# Patient Record
Sex: MALE | Race: WHITE | NOT HISPANIC OR LATINO | Employment: OTHER | URBAN - METROPOLITAN AREA
[De-identification: names, ages, dates, MRNs, and addresses within clinical notes are randomized per-mention and may not be internally consistent; named-entity substitution may affect disease eponyms.]

---

## 2021-05-28 ENCOUNTER — RECORDS - HEALTHEAST (OUTPATIENT)
Dept: ADMINISTRATIVE | Facility: CLINIC | Age: 66
End: 2021-05-28

## 2021-06-15 PROBLEM — E87.1 HYPONATREMIA: Status: ACTIVE | Noted: 2017-01-25

## 2021-06-15 PROBLEM — E22.2 SIADH (SYNDROME OF INAPPROPRIATE ADH PRODUCTION) (H): Status: ACTIVE | Noted: 2017-01-27

## 2021-06-15 PROBLEM — D50.9 MICROCYTIC ANEMIA: Status: ACTIVE | Noted: 2017-01-27

## 2021-06-16 PROBLEM — M31.6: Status: ACTIVE | Noted: 2018-09-04

## 2021-06-16 PROBLEM — M85.80 OSTEOPENIA: Status: ACTIVE | Noted: 2018-06-21

## 2023-09-22 ENCOUNTER — HOSPITAL ENCOUNTER (INPATIENT)
Facility: HOSPITAL | Age: 68
LOS: 3 days | Discharge: HOME | DRG: 178 | End: 2023-09-26
Attending: EMERGENCY MEDICINE | Admitting: HOSPITALIST
Payer: COMMERCIAL

## 2023-09-22 ENCOUNTER — APPOINTMENT (OUTPATIENT)
Dept: RADIOLOGY | Facility: HOSPITAL | Age: 68
DRG: 178 | End: 2023-09-22
Attending: EMERGENCY MEDICINE
Payer: COMMERCIAL

## 2023-09-22 DIAGNOSIS — K59.00 CONSTIPATION, UNSPECIFIED CONSTIPATION TYPE: ICD-10-CM

## 2023-09-22 DIAGNOSIS — I10 PRIMARY HYPERTENSION: Primary | ICD-10-CM

## 2023-09-22 DIAGNOSIS — U07.1 COVID-19: ICD-10-CM

## 2023-09-22 DIAGNOSIS — E87.1 HYPONATREMIA: ICD-10-CM

## 2023-09-22 LAB
ALBUMIN SERPL BCG-MCNC: 4.3 G/DL (ref 3.5–5.2)
ALP SERPL-CCNC: 48 U/L (ref 40–129)
ALT SERPL W P-5'-P-CCNC: 20 U/L (ref 0–70)
ANION GAP SERPL CALCULATED.3IONS-SCNC: 11 MMOL/L (ref 7–15)
AST SERPL W P-5'-P-CCNC: 35 U/L (ref 0–45)
BASOPHILS # BLD MANUAL: 0 10E3/UL (ref 0–0.2)
BASOPHILS NFR BLD MANUAL: 0 %
BILIRUB SERPL-MCNC: 0.3 MG/DL
BUN SERPL-MCNC: 8.3 MG/DL (ref 8–23)
CALCIUM SERPL-MCNC: 8.8 MG/DL (ref 8.8–10.2)
CHLORIDE SERPL-SCNC: 82 MMOL/L (ref 98–107)
CREAT SERPL-MCNC: 0.76 MG/DL (ref 0.67–1.17)
DEPRECATED HCO3 PLAS-SCNC: 25 MMOL/L (ref 22–29)
EGFRCR SERPLBLD CKD-EPI 2021: >90 ML/MIN/1.73M2
EOSINOPHIL # BLD MANUAL: 0 10E3/UL (ref 0–0.7)
EOSINOPHIL NFR BLD MANUAL: 0 %
ERYTHROCYTE [DISTWIDTH] IN BLOOD BY AUTOMATED COUNT: 14.3 % (ref 10–15)
FLUAV RNA SPEC QL NAA+PROBE: NEGATIVE
FLUBV RNA RESP QL NAA+PROBE: NEGATIVE
GLUCOSE SERPL-MCNC: 105 MG/DL (ref 70–99)
HCT VFR BLD AUTO: 34.8 % (ref 40–53)
HGB BLD-MCNC: 11 G/DL (ref 13.3–17.7)
HOLD SPECIMEN: NORMAL
HOLD SPECIMEN: NORMAL
LACTATE SERPL-SCNC: 0.6 MMOL/L (ref 0.7–2)
LYMPHOCYTES # BLD MANUAL: 0.6 10E3/UL (ref 0.8–5.3)
LYMPHOCYTES NFR BLD MANUAL: 10 %
MAGNESIUM SERPL-MCNC: 1.8 MG/DL (ref 1.7–2.3)
MCH RBC QN AUTO: 21 PG (ref 26.5–33)
MCHC RBC AUTO-ENTMCNC: 31.6 G/DL (ref 31.5–36.5)
MCV RBC AUTO: 66 FL (ref 78–100)
MONOCYTES # BLD MANUAL: 0.3 10E3/UL (ref 0–1.3)
MONOCYTES NFR BLD MANUAL: 6 %
NEUTROPHILS # BLD MANUAL: 4.7 10E3/UL (ref 1.6–8.3)
NEUTROPHILS NFR BLD MANUAL: 84 %
NRBC # BLD AUTO: 0.1 10E3/UL
NRBC BLD MANUAL-RTO: 1 %
PLAT MORPH BLD: ABNORMAL
PLATELET # BLD AUTO: 208 10E3/UL (ref 150–450)
POTASSIUM SERPL-SCNC: 3.9 MMOL/L (ref 3.4–5.3)
PROT SERPL-MCNC: 7.1 G/DL (ref 6.4–8.3)
RBC # BLD AUTO: 5.25 10E6/UL (ref 4.4–5.9)
RBC MORPH BLD: ABNORMAL
RSV RNA SPEC NAA+PROBE: NEGATIVE
SARS-COV-2 RNA RESP QL NAA+PROBE: POSITIVE
SODIUM SERPL-SCNC: 118 MMOL/L (ref 136–145)
TSH SERPL DL<=0.005 MIU/L-ACNC: 2.07 UIU/ML (ref 0.3–4.2)
WBC # BLD AUTO: 5.6 10E3/UL (ref 4–11)

## 2023-09-22 PROCEDURE — 85007 BL SMEAR W/DIFF WBC COUNT: CPT | Performed by: EMERGENCY MEDICINE

## 2023-09-22 PROCEDURE — 85027 COMPLETE CBC AUTOMATED: CPT | Performed by: EMERGENCY MEDICINE

## 2023-09-22 PROCEDURE — 85610 PROTHROMBIN TIME: CPT | Performed by: HOSPITALIST

## 2023-09-22 PROCEDURE — 82947 ASSAY GLUCOSE BLOOD QUANT: CPT | Performed by: EMERGENCY MEDICINE

## 2023-09-22 PROCEDURE — 87637 SARSCOV2&INF A&B&RSV AMP PRB: CPT | Performed by: FAMILY MEDICINE

## 2023-09-22 PROCEDURE — 36415 COLL VENOUS BLD VENIPUNCTURE: CPT | Performed by: EMERGENCY MEDICINE

## 2023-09-22 PROCEDURE — 85379 FIBRIN DEGRADATION QUANT: CPT | Performed by: HOSPITALIST

## 2023-09-22 PROCEDURE — 99285 EMERGENCY DEPT VISIT HI MDM: CPT | Mod: 25

## 2023-09-22 PROCEDURE — 86140 C-REACTIVE PROTEIN: CPT | Performed by: HOSPITALIST

## 2023-09-22 PROCEDURE — 83605 ASSAY OF LACTIC ACID: CPT | Performed by: EMERGENCY MEDICINE

## 2023-09-22 PROCEDURE — 71046 X-RAY EXAM CHEST 2 VIEWS: CPT

## 2023-09-22 PROCEDURE — 96361 HYDRATE IV INFUSION ADD-ON: CPT

## 2023-09-22 PROCEDURE — 250N000013 HC RX MED GY IP 250 OP 250 PS 637: Performed by: EMERGENCY MEDICINE

## 2023-09-22 PROCEDURE — 258N000003 HC RX IP 258 OP 636: Performed by: EMERGENCY MEDICINE

## 2023-09-22 PROCEDURE — 87040 BLOOD CULTURE FOR BACTERIA: CPT | Performed by: EMERGENCY MEDICINE

## 2023-09-22 PROCEDURE — 84443 ASSAY THYROID STIM HORMONE: CPT | Performed by: EMERGENCY MEDICINE

## 2023-09-22 PROCEDURE — 96360 HYDRATION IV INFUSION INIT: CPT

## 2023-09-22 PROCEDURE — 83735 ASSAY OF MAGNESIUM: CPT | Performed by: EMERGENCY MEDICINE

## 2023-09-22 RX ORDER — ACETAMINOPHEN 325 MG/1
975 TABLET ORAL ONCE
Status: COMPLETED | OUTPATIENT
Start: 2023-09-22 | End: 2023-09-22

## 2023-09-22 RX ADMIN — ACETAMINOPHEN 975 MG: 325 TABLET ORAL at 21:53

## 2023-09-22 RX ADMIN — SODIUM CHLORIDE 1000 ML: 9 INJECTION, SOLUTION INTRAVENOUS at 21:50

## 2023-09-22 ASSESSMENT — ENCOUNTER SYMPTOMS
RHINORRHEA: 1
DIARRHEA: 0
SHORTNESS OF BREATH: 0
SORE THROAT: 0
NAUSEA: 1
COUGH: 1
FATIGUE: 1
VOMITING: 0
FEVER: 1

## 2023-09-22 ASSESSMENT — ACTIVITIES OF DAILY LIVING (ADL)
ADLS_ACUITY_SCORE: 33
ADLS_ACUITY_SCORE: 35

## 2023-09-23 PROBLEM — U07.1 COVID-19: Status: ACTIVE | Noted: 2023-09-23

## 2023-09-23 PROBLEM — I47.10 PAROXYSMAL SUPRAVENTRICULAR TACHYCARDIA (H): Status: ACTIVE | Noted: 2022-11-01

## 2023-09-23 PROBLEM — I10 PRIMARY HYPERTENSION: Status: ACTIVE | Noted: 2022-11-07

## 2023-09-23 PROBLEM — F32.A ANXIETY AND DEPRESSION: Status: ACTIVE | Noted: 2022-11-01

## 2023-09-23 PROBLEM — F41.9 ANXIETY AND DEPRESSION: Status: ACTIVE | Noted: 2022-11-01

## 2023-09-23 PROBLEM — D56.3 ALPHA THALASSAEMIA MINOR: Status: ACTIVE | Noted: 2022-11-08

## 2023-09-23 LAB
ALBUMIN UR-MCNC: NEGATIVE MG/DL
APPEARANCE UR: CLEAR
BILIRUB UR QL STRIP: NEGATIVE
COLOR UR AUTO: COLORLESS
CORTICOSTER 1H P 250 UG ACTH SERPL-SCNC: 18.7 UG/DL
CORTICOSTER SERPL-MCNC: 10.1 UG/DL (ref 4–22)
CRP SERPL-MCNC: 26.9 MG/L
D DIMER PPP FEU-MCNC: 0.35 UG/ML FEU (ref 0–0.5)
GLUCOSE BLDC GLUCOMTR-MCNC: 134 MG/DL (ref 70–99)
GLUCOSE UR STRIP-MCNC: NEGATIVE MG/DL
HGB UR QL STRIP: ABNORMAL
INR PPP: 1.03 (ref 0.85–1.15)
KETONES UR STRIP-MCNC: NEGATIVE MG/DL
L PNEUMO1 AG UR QL IA: NEGATIVE
LEUKOCYTE ESTERASE UR QL STRIP: NEGATIVE
NITRATE UR QL: NEGATIVE
OSMOLALITY UR: 220 MMOL/KG (ref 100–1200)
PH UR STRIP: 7.5 [PH] (ref 5–7)
RBC URINE: 2 /HPF
SODIUM SERPL-SCNC: 120 MMOL/L (ref 136–145)
SODIUM SERPL-SCNC: 122 MMOL/L (ref 136–145)
SODIUM SERPL-SCNC: 122 MMOL/L (ref 136–145)
SODIUM SERPL-SCNC: 126 MMOL/L (ref 136–145)
SODIUM UR-SCNC: 77 MMOL/L
SP GR UR STRIP: 1.01 (ref 1–1.03)
UROBILINOGEN UR STRIP-MCNC: <2 MG/DL
WBC URINE: <1 /HPF

## 2023-09-23 PROCEDURE — 99223 1ST HOSP IP/OBS HIGH 75: CPT | Performed by: HOSPITALIST

## 2023-09-23 PROCEDURE — 87899 AGENT NOS ASSAY W/OPTIC: CPT | Performed by: HOSPITALIST

## 2023-09-23 PROCEDURE — 250N000013 HC RX MED GY IP 250 OP 250 PS 637: Performed by: HOSPITALIST

## 2023-09-23 PROCEDURE — 36415 COLL VENOUS BLD VENIPUNCTURE: CPT | Performed by: INTERNAL MEDICINE

## 2023-09-23 PROCEDURE — 83935 ASSAY OF URINE OSMOLALITY: CPT | Performed by: HOSPITALIST

## 2023-09-23 PROCEDURE — 99207 PR NO BILLABLE SERVICE THIS VISIT: CPT | Performed by: INTERNAL MEDICINE

## 2023-09-23 PROCEDURE — 250N000011 HC RX IP 250 OP 636: Mod: JZ | Performed by: HOSPITALIST

## 2023-09-23 PROCEDURE — 84300 ASSAY OF URINE SODIUM: CPT | Performed by: HOSPITALIST

## 2023-09-23 PROCEDURE — 120N000001 HC R&B MED SURG/OB

## 2023-09-23 PROCEDURE — 82962 GLUCOSE BLOOD TEST: CPT

## 2023-09-23 PROCEDURE — 250N000012 HC RX MED GY IP 250 OP 636 PS 637: Performed by: HOSPITALIST

## 2023-09-23 PROCEDURE — 250N000013 HC RX MED GY IP 250 OP 250 PS 637: Performed by: INTERNAL MEDICINE

## 2023-09-23 PROCEDURE — G0378 HOSPITAL OBSERVATION PER HR: HCPCS

## 2023-09-23 PROCEDURE — 82533 TOTAL CORTISOL: CPT | Performed by: HOSPITALIST

## 2023-09-23 PROCEDURE — 999N000157 HC STATISTIC RCP TIME EA 10 MIN

## 2023-09-23 PROCEDURE — 93005 ELECTROCARDIOGRAM TRACING: CPT | Performed by: EMERGENCY MEDICINE

## 2023-09-23 PROCEDURE — 36415 COLL VENOUS BLD VENIPUNCTURE: CPT | Performed by: HOSPITALIST

## 2023-09-23 PROCEDURE — 84295 ASSAY OF SERUM SODIUM: CPT | Performed by: INTERNAL MEDICINE

## 2023-09-23 PROCEDURE — 81001 URINALYSIS AUTO W/SCOPE: CPT | Performed by: EMERGENCY MEDICINE

## 2023-09-23 PROCEDURE — 99418 PROLNG IP/OBS E/M EA 15 MIN: CPT | Performed by: HOSPITALIST

## 2023-09-23 PROCEDURE — 84295 ASSAY OF SERUM SODIUM: CPT | Performed by: HOSPITALIST

## 2023-09-23 RX ORDER — HYDRALAZINE HYDROCHLORIDE 20 MG/ML
10 INJECTION INTRAMUSCULAR; INTRAVENOUS EVERY 6 HOURS PRN
Status: DISCONTINUED | OUTPATIENT
Start: 2023-09-23 | End: 2023-09-26 | Stop reason: HOSPADM

## 2023-09-23 RX ORDER — ACETAMINOPHEN 650 MG/1
650 SUPPOSITORY RECTAL EVERY 6 HOURS PRN
Status: DISCONTINUED | OUTPATIENT
Start: 2023-09-23 | End: 2023-09-25

## 2023-09-23 RX ORDER — ONDANSETRON 2 MG/ML
4 INJECTION INTRAMUSCULAR; INTRAVENOUS EVERY 6 HOURS PRN
Status: DISCONTINUED | OUTPATIENT
Start: 2023-09-23 | End: 2023-09-26 | Stop reason: HOSPADM

## 2023-09-23 RX ORDER — ENOXAPARIN SODIUM 100 MG/ML
40 INJECTION SUBCUTANEOUS EVERY 24 HOURS
Status: DISCONTINUED | OUTPATIENT
Start: 2023-09-23 | End: 2023-09-26 | Stop reason: HOSPADM

## 2023-09-23 RX ORDER — SODIUM CHLORIDE 1 G/1
1 TABLET ORAL
Status: DISCONTINUED | OUTPATIENT
Start: 2023-09-24 | End: 2023-09-26 | Stop reason: HOSPADM

## 2023-09-23 RX ORDER — ONDANSETRON 4 MG/1
4 TABLET, ORALLY DISINTEGRATING ORAL EVERY 6 HOURS PRN
Status: DISCONTINUED | OUTPATIENT
Start: 2023-09-23 | End: 2023-09-26 | Stop reason: HOSPADM

## 2023-09-23 RX ORDER — PROCHLORPERAZINE MALEATE 5 MG
5 TABLET ORAL EVERY 6 HOURS PRN
Status: DISCONTINUED | OUTPATIENT
Start: 2023-09-23 | End: 2023-09-26 | Stop reason: HOSPADM

## 2023-09-23 RX ORDER — LIDOCAINE 40 MG/G
CREAM TOPICAL
Status: DISCONTINUED | OUTPATIENT
Start: 2023-09-23 | End: 2023-09-26 | Stop reason: HOSPADM

## 2023-09-23 RX ORDER — VITAMIN B COMPLEX
25 TABLET ORAL EVERY OTHER DAY
Status: DISCONTINUED | OUTPATIENT
Start: 2023-09-23 | End: 2023-09-26 | Stop reason: HOSPADM

## 2023-09-23 RX ORDER — ACETAMINOPHEN 325 MG/1
650 TABLET ORAL EVERY 6 HOURS PRN
Status: DISCONTINUED | OUTPATIENT
Start: 2023-09-23 | End: 2023-09-26 | Stop reason: HOSPADM

## 2023-09-23 RX ORDER — PROCHLORPERAZINE 25 MG
12.5 SUPPOSITORY, RECTAL RECTAL EVERY 12 HOURS PRN
Status: DISCONTINUED | OUTPATIENT
Start: 2023-09-23 | End: 2023-09-26 | Stop reason: HOSPADM

## 2023-09-23 RX ORDER — COSYNTROPIN 0.25 MG/ML
250 INJECTION, POWDER, FOR SOLUTION INTRAMUSCULAR; INTRAVENOUS ONCE
Status: COMPLETED | OUTPATIENT
Start: 2023-09-23 | End: 2023-09-23

## 2023-09-23 RX ORDER — LEVOTHYROXINE SODIUM 25 UG/1
75 TABLET ORAL DAILY
Status: DISCONTINUED | OUTPATIENT
Start: 2023-09-23 | End: 2023-09-26 | Stop reason: HOSPADM

## 2023-09-23 RX ORDER — GUAIFENESIN 200 MG/10ML
200 LIQUID ORAL EVERY 4 HOURS PRN
Status: DISCONTINUED | OUTPATIENT
Start: 2023-09-23 | End: 2023-09-26 | Stop reason: HOSPADM

## 2023-09-23 RX ORDER — MULTIPLE VITAMINS W/ MINERALS TAB 9MG-400MCG
1 TAB ORAL DAILY
Status: DISCONTINUED | OUTPATIENT
Start: 2023-09-23 | End: 2023-09-26 | Stop reason: HOSPADM

## 2023-09-23 RX ADMIN — BENZOCAINE 6 MG-MENTHOL 10 MG LOZENGES 1 LOZENGE: at 21:54

## 2023-09-23 RX ADMIN — PREDNISONE 1.5 MG: 1 TABLET ORAL at 07:55

## 2023-09-23 RX ADMIN — ENOXAPARIN SODIUM 40 MG: 40 INJECTION SUBCUTANEOUS at 07:55

## 2023-09-23 RX ADMIN — LEVOTHYROXINE SODIUM 75 MCG: 0.03 TABLET ORAL at 06:06

## 2023-09-23 RX ADMIN — Medication 1 TABLET: at 07:55

## 2023-09-23 RX ADMIN — Medication 25 MCG: at 10:05

## 2023-09-23 RX ADMIN — COSYNTROPIN 250 MCG: 0.25 INJECTION, POWDER, LYOPHILIZED, FOR SOLUTION INTRAMUSCULAR; INTRAVENOUS at 03:13

## 2023-09-23 ASSESSMENT — ACTIVITIES OF DAILY LIVING (ADL)
ADLS_ACUITY_SCORE: 35
ADLS_ACUITY_SCORE: 39
ADLS_ACUITY_SCORE: 35
ADLS_ACUITY_SCORE: 39
DEPENDENT_IADLS:: INDEPENDENT
ADLS_ACUITY_SCORE: 35

## 2023-09-23 NOTE — ED NOTES
"Pt continuously desatting while asleep. When asked if Pt has SAMANTHA he states \"I'm not sure but I snore a lot\". Pt placed on 3L NC just while asleep. Maintains oxygenation while awake.   "

## 2023-09-23 NOTE — H&P
[facility]    History and Physical - Hospitalist Service       Date of Admission:  9/22/2023    Assessment & Plan      Gary Sutherland is a 68 year old male admitted on 9/22/2023. He just came back from [place] cruise trip and came to the ED for evaluation of fever, cough, fatigue x5 days.    # Confirmed COVID-19 infection         Symptom Onset 9/18/2023   Date of 1st Positive Test 9/22/2023   Vaccination Status Fully Vaccinated       - COVID-19 special precautions, continuous pulse-ox  - Oxygen: continue current support with O2 Device: Nasal cannula at Oxygen Delivery: 3 LPM; titrate to keep SpO2 between 90-96%  - Labs: Daily COVID labs ordered x4 days (CBC, BMP, D-dimer, CRP).  Continue to trend after 4 days as needed.   - Imaging: no additional imaging needed at this time  - Breathing treatments: no inhalers needed; avoid nebulizers in favor of MDIs   - IV fluids: NS 1 L bolus given in the ED, hold off additional IV fluids until repeat sodium level, urine osmolality and sodium level as well as cosyntropin test is completed.  If additional IV fluids needed, will use LR.  - Antibiotics: not indicated   - COVID-Focused Medications: Paxlovid started on admission.    - DVT Prophylaxis:         - At high risk of thrombotic complications due to COVID-19 (DDimer = N/A )         -  D-dimer 0.35  -Lovenox 40 mg daily    #Hyponatremia  -History of SIADH: Check urine sodium and urine osmolality, if SIADH will treat with fluid restriction  -Hold off additional IV fluids until etiology is identified  -TSH 2.07, euthyroid, not the culprit  -Has not taken prednisone recently although only takes 1.5 mg daily and it is less likely to disturb hypothalamic pituitary adrenal axis  -Check cosyntropin test, if positive will hold prednisone and start hydrocortisone  -Check sodium level every 4 hours; correction goal no more than 6-8 mmol in 24 hours    #Giant cell arteritis  -History of temporal arteritis,  Raynaud's and ankylosing spondylitis  -On prednisone since 2015  -Resume PTA prednisone 1.5 mg daily unless need for hydrocortisone to treat adrenal insufficiency or dexamethasone to treat COVID-related hypoxia    #Hypoxia  -Only noted when patient is asleep  -Chest x-ray showed hyperinflation versus deep inspiration changes, clinical correlation for air trapping, no focal lung infiltrates  -Oxygen via nasal cannula applied for sleep  -Patient reports snoring, recommended outpatient sleep study    #Hypothyroidism  -Continue PTA levothyroxine    #Hyperlipidemia  -Hold atorvastatin while on Paxlovid    #Elevated blood pressure without hypertension  -Not on PTA medications for blood pressure control although hypertension is listed on problem list  -IV hydralazine as needed    #Microcytic anemia  -History of alpha thalassemia trait  -Monitor for bleeding  -Outpatient work-up         Diet: Combination Diet Regular Diet Adult    DVT Prophylaxis: Enoxaparin (Lovenox) SQ  Early Catheter: Not present  Lines: None     Cardiac Monitoring: None  Code Status: Full Code          Disposition Plan      Expected Discharge Date: 09/25/2023                  Cipriano Persaud MD  Hospitalist Service  [facility]  Securely message with [employer] (more info)  Text page via [employer] Paging/Directory     ______________________________________________________________________    Chief Complaint   Fatigue, fever, cough x5 days    History is obtained from the patient, electronic health record, and emergency department physician    History of Present Illness   Gary Sutherland is a 68 year old male who came to the emergency department for evaluation of above chief complaint.  Past medical history of giant cell arteritis, temporal arteritis, Raynaud's disease, ankylosing spondylitis, alpha thalassemia trait, hyperlipidemia, hypertension, PSVT, depression, anxiety, GERD, SIADH.  Patient went on a 10-day [place] cruise in [place] and  was taking care of his wife who got sick at the beginning of the trip.  He then felt febrile with cough and fatigue that started about 5 days ago.  They just flew back to [place] from [place] and came to the ED for further evaluation.  Patient reports not taking his medications recently while caring for his wife.  He denied chest pain, shortness of breath, palpitations, nausea, vomiting or diarrhea.      Past Medical History    No past medical history on file.    Past Surgical History   Past Surgical History:   Procedure Laterality Date    ARTERY BIOPSY Bilateral [id number]    TONSILLECTOMY         Prior to Admission Medications   Prior to Admission Medications   Prescriptions Last Dose Informant Patient Reported? Taking?   Multiple Vitamins-Minerals (MULTIVITAMIN MEN 50+ PO) Past Week at am  Yes Yes   Sig: Take 1 tablet by mouth daily   atorvastatin (LIPITOR) 10 MG tablet Past Week at am  Yes Yes   Sig: [ATORVASTATIN (LIPITOR) 10 MG TABLET] Take 10 mg by mouth daily with lunch.    cholecalciferol, vitamin D3, 1,000 unit tablet Past Week at am  Yes Yes   Sig: Take 1,000 Units by mouth every other day   levothyroxine (SYNTHROID, LEVOTHROID) 75 MCG tablet Past Week at am  Yes Yes   Sig: [LEVOTHYROXINE (SYNTHROID, LEVOTHROID) 75 MCG TABLET] Take 75 mcg by mouth Daily at 6:00 am.    predniSONE (DELTASONE) 1 MG tablet Past Week at am  Yes Yes   Sig: Take 1.5 mg by mouth daily      Facility-Administered Medications: None           Physical Exam   Vital Signs: Temp: 100.2  F (37.9  C) Temp src: Oral BP: (!) 156/88 Pulse: 87   Resp: 19 SpO2: 97 % O2 Device: Nasal cannula Oxygen Delivery: 3 LPM  Weight: 141 lbs 4.8 oz    Constitutional: awake and alert  Respiratory: no increased work of breathing and good air exchange  Cardiovascular: regular rate and rhythm and normal S1 and S2  GI: normal bowel sounds and soft  Skin: no bruising or bleeding  Musculoskeletal: no lower extremity pitting edema present  Neurologic: Mental  Status Exam:  Level of Alertness:   awake  Motor Exam:  moves all extremities well and symmetrically    Medical Decision Making       55 MINUTES SPENT BY ME on the date of service doing chart review, history, exam, documentation & further activities per the note.  MANAGEMENT DISCUSSED with the following over the past 24 hours: Patient       Data     I have personally reviewed the following data over the past 24 hrs:    5.6  \   11.0 (L)   / 208     118 (LL) 82 (L) 8.3 /  105 (H)   3.9 25 0.76 \     ALT: 20 AST: 35 AP: 48 TBILI: 0.3   ALB: 4.3 TOT PROTEIN: 7.1 LIPASE: N/A     TSH: 2.07 T4: N/A A1C: N/A     Procal: N/A CRP: 26.90 (H) Lactic Acid: 0.6 (L)       INR:  1.03 PTT:  N/A   D-dimer:  0.35 Fibrinogen:  N/A       Imaging results reviewed over the past 24 hrs:   Recent Results (from the past 24 hour(s))   Chest XR,  PA & LAT    Narrative    EXAM: XR CHEST 2 VIEWS  LOCATION: [facility]  DATE: 9/22/2023    INDICATION: fever  COMPARISON: 06/20/2005      Impression    IMPRESSION:   Hyperinflation versus deep inspiration changes. Clinical correlation for air trapping. Heart size and pulmonary vascularity within normal limits. No focal lung infiltrates. Pectus excavatum. Osseous structures grossly intact. Visualized   upper abdomen unremarkable.

## 2023-09-23 NOTE — PROGRESS NOTES
[facility]    Medicine Progress Note - Hospitalist Service    Date of Admission:  9/22/2023    Assessment & Plan      Gary Sutherland is a 68 year old male admitted on 9/22/2023 for hyponatremia.     Confirmed COVID-19 infection        Symptom Onset 9/18/2023   Date of 1st Positive Test 9/22/2023   Vaccination Status Fully Vaccinated       He just came back from [place] cruise trip and came to the ED for evaluation of fever, cough, fatigue x5 days.  Chest XR: No focal lung infiltrates.    - COVID-19 special precautions, continuous pulse-ox  - Oxygen: Briefly low at 87% on room air when asleep and needed 3 LPM supplemental oxygen. He then got tapered off and remains stable on room air. Continue supplemental oxygen as needed, titrate to keep SpO2 between 90-96%.   - Labs: Daily COVID labs ordered x4 days (CBC, BMP, D-dimer, CRP).  Continue to trend after 4 days as needed.   - Imaging: no additional imaging needed at this time  - Breathing treatments: no inhalers needed; avoid nebulizers in favor of MDIs   - IV fluids: NS 1 L bolus given in the ED. No additional IVF indicated.  - Antibiotics: not indicated   - COVID-Focused Medications: Paxlovid started on admission.    - DVT Prophylaxis:         - At high risk of thrombotic complications due to COVID-19 (DDimer = N/A )         -  D-dimer 0.35  -Lovenox 40 mg daily    Hyponatremia: Sodium 118 on admission. History of SIADH. Pr patient, his sodium is normally at the level of 130.   TSH 2.07, euthyroid.  - Fluid restriction of 1200 ml  - Continue to monitor. Sodium now improved to 126.   - Cosyntropin test was ordered. Follow up result.    Giant cell arteritis: History of temporal arteritis, Raynaud's and ankylosing spondylitis  - On prednisone since 2015. Resume PTA prednisone 1.5 mg daily.    Hypoxia: Only noted when patient is asleep  - Oxygen via nasal cannula applied for sleep  - Patient reports snoring, recommend outpatient sleep  study    Syncope: Patient had a syncope event today. EKG showed NSR. Symptoms most consistent with vasovagal syncope or orthostatic hypotension.  - Orthostatic BP  - Telemetry    Hypothyroidism: Continue PTA levothyroxine    Hyperlipidemia: Hold atorvastatin while on Paxlovid    Elevated blood pressure without hypertension  - Not on PTA medications for blood pressure control although hypertension is listed on problem list  - IV hydralazine as needed    Microcytic anemia: History of alpha thalassemia trait. Monitor.        Diet: Combination Diet Regular Diet Adult  Fluid restriction 1200 ML FLUID    DVT Prophylaxis: Enoxaparin (Lovenox) SQ  Early Catheter: Not present  Lines: None     Cardiac Monitoring: None  Code Status: Full Code      Clinically Significant Risk Factors Present on Admission         # Hyponatremia: Lowest Na = 118 mmol/L in last 2 days, will monitor as appropriate          # Hypertension: Noted on problem list                 Disposition Plan      Expected Discharge Date: 09/25/2023      Destination: home with family            Angelo Rosario MD  Hospitalist Service  [facility]  Securely message with [employer] (more info)  Text page via [employer] Paging/Directory   ______________________________________________________________________    Interval History   When seen in the morning, patient reports feeling well. No cough and no SOB. He ate lunch well. In the afternoon, he tried to get out of bed and had a walk. He suddenly felt dizzy and his legs gave out. He had a large incontinence urine. No LOC. RRT was called. He was put back to bed. He reports that he felt better.     Physical Exam   Vital Signs: Temp: 100.2  F (37.9  C) Temp src: Oral BP: 118/66 Pulse: 71   Resp: 15 SpO2: 100 % O2 Device: Nasal cannula Oxygen Delivery: 3 LPM  Weight: 141 lbs 4.8 oz    General appearance: not in acute distress  HEENT: PERRL, EOMI  Lungs: Clear breath sounds in bilateral lung  fields  Cardiovascular: Regular rate and rhythm, normal S1-S2  Abdomen: Soft, non tender, no distension  Musculoskeletal: No joint swelling  Skin: No rash and no edema  Neurology: AAO ×3.  Cranial nerves II - XII normal.  Normal muscle strength in all four extremities.     Medical Decision Making       47 MINUTES SPENT BY ME on the date of service doing chart review, history, exam, documentation & further activities per the note.      Data     I have personally reviewed the following data over the past 24 hrs:    5.6  \   11.0 (L)   / 208     126 (L) 82 (L) 8.3 /  134 (H)   3.9 25 0.76 \     ALT: 20 AST: 35 AP: 48 TBILI: 0.3   ALB: 4.3 TOT PROTEIN: 7.1 LIPASE: N/A     TSH: 2.07 T4: N/A A1C: N/A     Procal: N/A CRP: 26.90 (H) Lactic Acid: 0.6 (L)       INR:  1.03 PTT:  N/A   D-dimer:  0.35 Fibrinogen:  N/A       Imaging results reviewed over the past 24 hrs:   Recent Results (from the past 24 hour(s))   Chest XR,  PA & LAT    Narrative    EXAM: XR CHEST 2 VIEWS  LOCATION: [facility]  DATE: 9/22/2023    INDICATION: fever  COMPARISON: 06/20/2005      Impression    IMPRESSION:   Hyperinflation versus deep inspiration changes. Clinical correlation for air trapping. Heart size and pulmonary vascularity within normal limits. No focal lung infiltrates. Pectus excavatum. Osseous structures grossly intact. Visualized   upper abdomen unremarkable.

## 2023-09-23 NOTE — MEDICATION SCRIBE - ADMISSION MEDICATION HISTORY
Medication Scribe Admission Medication History    Admission medication history is complete. The information provided in this note is only as accurate as the sources available at the time of the update.    Medication reconciliation/reorder completed by provider prior to medication history? No    Information Source(s): Patient via in-person    Pertinent Information:     Patient reports that the last time they remember taking medications was sometime this past week. It was not today or yesterday.     Patient reports taking prednisone 1.5 mg daily.    Patient reports taking D3 every other day.     Patient reports no longer taking: Caltrate, Folic Acid, Methotrexate, Joint Supplement    Changes made to PTA medication list:  Added: Multivitamin  Deleted: Caltrate, Folic Acid, Methotrexate, Joint Supplement  Changed: Prednisone 2 mg to 1.5 mg, D3 from daily to every other day.     Medication Affordability:  Not including over the counter (OTC) medications, was there a time in the past 3 months when you did not take your medications as prescribed because of cost?: No    Allergies reviewed with patient and updates made in EHR: yes    Medication History Completed By: Juan Cardona 9/22/2023 10:57 PM    Prior to Admission medications    Medication Sig Last Dose Taking? Auth Provider Long Term End Date   atorvastatin (LIPITOR) 10 MG tablet [ATORVASTATIN (LIPITOR) 10 MG TABLET] Take 10 mg by mouth daily with lunch.  Past Week at am Yes Provider, Historical     cholecalciferol, vitamin D3, 1,000 unit tablet Take 1,000 Units by mouth every other day Past Week at am Yes Provider, Historical     levothyroxine (SYNTHROID, LEVOTHROID) 75 MCG tablet [LEVOTHYROXINE (SYNTHROID, LEVOTHROID) 75 MCG TABLET] Take 75 mcg by mouth Daily at 6:00 am.  Past Week at am Yes Provider, Historical     predniSONE (DELTASONE) 1 MG tablet Take 1.5 mg by mouth daily Past Week at am Yes Provider, Historical

## 2023-09-23 NOTE — ED NOTES
I, Mirna Savage MD have reviewed the documentation, personally taken the patient's history, performed an exam and agree with the physical finds, diagnosis and management plan.  I saw this patient with Coby Baldwin PA-C.  ED Course as of 09/22/23 2316   Fri Sep 22, 2023   2216 I discussed the case with Coby Baldwin PA-C.  Patient is a 68-year-old male that comes in today with fevers that started today.  He had recently been traveling to [place] with his wife.  She has had some similar symptoms since Monday.  He did test positive for COVID here and had a sodium of 118 so he will need admission to the hospital.   2315 Evaluated the patient and discussed the plan with him.  He is in agreement.  He is willing to transfer to [place] if there is a bed there.  He does not really want to transfer too far away otherwise.  We will work on getting him admitted or transferred as appropriate.  He is in agreement.  His wife has been updated.       I personally saw the patient and performed a substantive portion of the visit including all aspects of the medical decision making.       Mirna Savage MD  09/22/23 2316

## 2023-09-23 NOTE — ED NOTES
I was called to the patient's room as he had had a syncopal episode.  I informally with this patient as I did see him yesterday with our physician assistant.  He was admitted to the hospital for sodium of 118 and was found to be COVID-positive.  He had had poor oral intake for the few days prior.  He had wanted to get up and walk around the room.  He was not hooked up to the monitor.  He was with the nurse.  He became lightheaded and told the nurse that and they were working their way toward the bed and he had a syncope versus near syncopal episode.  The nurse did catch him and was holding him on her knee.  He was incontinent of brown stool that was both formed and loose.  There was no melena noted.  By the time I got into the room the patient was sitting on the floor and was awake.  He had no injuries.  Blood sugar was obtained and was 134.  Patient was feeling mostly back to his normal self.  He did not have a postictal period as we would suspect if there was any seizure involved.  A rapid response was called.  I did update Dr. Rosario with the hospitalist service.  She is in agreement.  We will get an EKG on the patient and she will reevaluate him.     Mirna Savage MD  09/23/23 0169

## 2023-09-23 NOTE — ED TRIAGE NOTES
Pt arrives with wife after a 10 day trip in [place] with complaints of fever, cough, and fatigue for the past 5 days or so. Wife has similar symptoms and hers began first. Pt received a Covid booster about a month ago. Denies any N/V/D. No chest pain or SOB. Does have a sinus headache. Last had Tylenol around 12pm     Triage Assessment       Row Name 09/22/23 1926       Triage Assessment (Adult)    Airway WDL WDL       Respiratory WDL    Respiratory WDL X;cough    Cough Frequency frequent    Cough Type congested;productive       Skin Circulation/Temperature WDL    Skin Circulation/Temperature WDL WDL       Cardiac WDL    Cardiac WDL WDL       Peripheral/Neurovascular WDL    Peripheral Neurovascular WDL WDL       Cognitive/Neuro/Behavioral WDL    Cognitive/Neuro/Behavioral WDL WDL

## 2023-09-23 NOTE — CONSULTS
Care Management Initial Consult    General Information  Assessment completed with: Gary Matthews via phone  Type of CM/SW Visit: Initial Assessment    Primary Care Provider verified and updated as needed: Yes   Readmission within the last 30 days: no previous admission in last 30 days      Reason for Consult: discharge planning  Advance Care Planning: Advance Care Planning Reviewed: no concerns identified          Communication Assessment  Patient's communication style: spoken language (English or Bilingual)          Living Environment:   People in home: spouse  Gary and wife Radha  Current living Arrangements: house      Able to return to prior arrangements: yes       Family/Social Support:  Care provided by: self  Provides care for: no one  Marital Status:   Wife, Children  Radha       Description of Support System: Supportive, Involved    Support Assessment: Adequate family and caregiver support, Adequate social supports, Patient communicates needs well met    Current Resources:   Patient receiving home care services: No     Community Resources: None  Equipment currently used at home: none  Supplies currently used at home: None    Employment/Financial:  Employment Status: retired        Financial Concerns:     Referral to Financial Worker: No       Does the patient's insurance plan have a 3 day qualifying hospital stay waiver?  No    Lifestyle & Psychosocial Needs:  Social Determinants of Health     Food Insecurity: Not on file   Depression: Not on file   Housing Stability: Not on file   Tobacco Use: Low Risk  (7/11/2021)    Patient History     Smoking Tobacco Use: Never     Smokeless Tobacco Use: Never     Passive Exposure: Not on file   Financial Resource Strain: Not on file   Alcohol Use: Not on file   Transportation Needs: Not on file   Physical Activity: Not on file   Interpersonal Safety: Not on file   Stress: Not on file   Social Connections: Not on file       Functional Status:  Prior to  "admission patient needed assistance:   Dependent ADLs:: Independent, Ambulation-no assistive device  Dependent IADLs:: Independent       Mental Health Status:          Chemical Dependency Status:                Values/Beliefs:  Spiritual, Cultural Beliefs, Baptism Practices, Values that affect care:                 Additional Information:  Gary was found to be COVID +. He lives in a house with his wife. \"I was just caring for my wife because she got sick first. We were on a 10 day [employer] Cruise in [place] and she got sick at the beginning. I got sick later. She is normally able to be independent, both of us are when we are not sick\".     He is independent with ADLs and IADLs. He drives.    Likely home at discharge, may need home oxygen.     Family to transport at discharge.    CM to follow for medical progression of care, discharge recommendations, and final discharge plan.    Sandra Huerta RN    "

## 2023-09-23 NOTE — ED PROVIDER NOTES
EMERGENCY DEPARTMENT ENCOUNTER      NAME: Gary Sutherland  AGE: 68 year old male  YOB: 1955  MRN: [id number]  EVALUATION DATE & TIME: 9/22/2023  8:46 PM    PCP: Dc Pierre    ED PROVIDER: Coby Baldwin PA-C      Chief Complaint   Patient presents with    Fever    Fatigue    Cough         FINAL IMPRESSION:  1. Hyponatremia    2. COVID-19          ED COURSE & MEDICAL DECISION MAKING:    Pertinent Labs & Imaging studies reviewed. (See chart for details)    68 year old male presents to the Emergency Department for evaluation of fatigue and cough.    Physical exam is remarkable for an ill but nontoxic-appearing male.  Heart and lung sounds are clear diffusely throughout, no respiratory distress.  Oropharynx is remarkable for dry lips but moist mucous membranes.  Abdomen is soft and nontender.  Patient is alert and oriented to self, place, and date however he seems very fatigued and loses his train of thought frequently during my initial evaluation.  Vital signs remarkable for hypertension, borderline febrile with temperature of 100.2  F.    CBC is remarkable for mild anemia with hemoglobin of 11.0, no leukocytosis.  CMP remarkable for hyponatremia with sodium of 118, normal liver and kidney function.  Lactic acid within normal limits.  Magnesium within normal limits.  TSH within normal limits.  Blood cultures are pending.  COVID test is positive.  Chest x-ray without evidence of focal pneumonia.    The patient was given Tylenol and 1 L of normal saline here.  I do not think any further emergent labs or imaging are indicated at this time.  The patient will be admitted to the hospital for treatment of his hyponatremia/COVID-19, his wife who was also here being evaluated for similar symptoms was updated with this plan. Patient stated that he would be willing to transfer to [facility] as it is close to his home but there were no beds available; patient will board in the ED at [facility].      Medical Decision Making    History:  Supplemental history from: Documented in chart, if applicable  External Record(s) reviewed: Documented in chart, if applicable.    Work Up:  Chart documentation includes differential considered and any EKGs or imaging independently interpreted by provider, where specified.  In additional to work up documented, I considered the following work up: Documented in chart, if applicable.    External consultation:  Discussion of management with another provider: Hospitalist    Complicating factors:  Care impacted by chronic illness: N/A  Care affected by social determinants of health: N/A    Disposition considerations: Admit.    ED Course   9:36 PM  Performed my initial history and physical exam. Discussed workup in the emergency department, management of symptoms, and likely disposition.   10:57 PM Rechecked and updated the patient.    1:13 AM Discussed with hospitalist.    At the conclusion of the encounter I discussed the results of all of the tests and the disposition. The questions were answered. The patient or family acknowledged understanding and was agreeable with the care plan.     Voice recognition software was used in the creation of this note. Any grammatical or nonsensical errors are due to inherent errors with the software and are not the intention of the writer.     MEDICATIONS GIVEN IN THE EMERGENCY:  Medications   sodium chloride 0.9% BOLUS 1,000 mL (0 mLs Intravenous Stopped 9/22/23 9644)   acetaminophen (TYLENOL) tablet 975 mg (975 mg Oral $Given 9/22/23 2153)       NEW PRESCRIPTIONS STARTED AT TODAY'S ER VISIT  New Prescriptions    No medications on file            =================================================================    HPI    Patient information was obtained from: Patient     Use of : N/A     Gary Sutherland is a 68 year old male who presents to the ED for evaluation of fatigue, fever, and cough.     The patient notes that him and his wife  "left for a trip to [facility] 10 days ago and got home today. He notes that they both started feeling sick half way through the trip. For the first few days, he was primarily taking care of his wife but his symptoms worsened significantly yesterday. He endorses fatigue, rhinorrhea, cough, fever, and nausea. He also reports that he was feeling \"goofy and off\". The patient denies sore throat, chest pain, shortness of breath, abdominal pain, vomiting, and diarrhea. He is fully vaccinated against COVID.       REVIEW OF SYSTEMS   Review of Systems   Constitutional:  Positive for fatigue and fever.   HENT:  Positive for rhinorrhea. Negative for sore throat.    Respiratory:  Positive for cough. Negative for shortness of breath.    Cardiovascular:  Negative for chest pain.   Gastrointestinal:  Positive for nausea. Negative for diarrhea and vomiting.       All other systems reviewed and are negative unless noted in HPI.      PAST MEDICAL HISTORY:  No past medical history on file.    PAST SURGICAL HISTORY:  Past Surgical History:   Procedure Laterality Date    ARTERY BIOPSY Bilateral [id number]    TONSILLECTOMY         CURRENT MEDICATIONS:    atorvastatin (LIPITOR) 10 MG tablet  cholecalciferol, vitamin D3, 1,000 unit tablet  levothyroxine (SYNTHROID, LEVOTHROID) 75 MCG tablet  Multiple Vitamins-Minerals (MULTIVITAMIN MEN 50+ PO)  predniSONE (DELTASONE) 1 MG tablet        ALLERGIES:  No Known Allergies    FAMILY HISTORY:  Family History   Problem Relation Age of Onset    Heart Disease Mother     Thyroid Disease Mother     Anemia Mother     Coronary Artery Disease Father     Breast Cancer Sister     Skin Cancer Sister     Thyroid Disease Sister     Cerebrovascular Disease Brother        SOCIAL HISTORY:   Social History     Socioeconomic History    Marital status:    Tobacco Use    Smoking status: Never    Smokeless tobacco: Never   Substance and Sexual Activity    Alcohol use: Yes    Drug use: No       VITALS:  Patient " Vitals for the past 24 hrs:   BP Temp Temp src Pulse Resp SpO2 Height Weight   09/22/23 2246 (!) 175/89 -- -- 77 16 99 % -- --   09/22/23 2203 -- -- -- 79 -- 100 % -- --   09/22/23 2200 -- -- -- 81 -- 99 % -- --   09/22/23 2157 -- -- -- 82 -- 99 % -- --   09/22/23 1931 -- -- -- -- -- -- -- 64.1 kg (141 lb 4.8 oz)   09/22/23 1929 (!) 168/91 -- -- -- -- -- -- --   09/22/23 1924 (!) 193/93 100.2  F (37.9  C) Oral 82 22 99 % 1.829 m (6') --       PHYSICAL EXAM    VITAL SIGNS: BP (!) 175/89   Pulse 77   Temp 100.2  F (37.9  C) (Oral)   Resp 16   Ht 1.829 m (6')   Wt 64.1 kg (141 lb 4.8 oz)   SpO2 99%   BMI 19.16 kg/m    General Appearance: Ill but non-toxic appearing; Alert, cooperative, normal speech and facial symmetry, appears stated age, the patient does not appear in distress  Head:  Normocephalic, without obvious abnormality, atraumatic  Eyes: Conjunctiva/corneas clear, EOM's intact, no nystagmus, PERRL  ENT: Dry lips; mucosa and tongue normal; teeth and gums normal, no pharyngeal inflammation, no dysphonia or difficulty swallowing, membranes are moist without pallor  Cardio:  Regular rate and rhythm, S1 and S2 normal, no murmur, rub    or gallop, 2+ pulses symmetric in all extremities  Pulm:  Clear to auscultation bilaterally, respirations unlabored with no accessory muscle use  Abdomen:  Abdomen is soft, non-distended with no tenderness to palpation, rebound tenderness, or guarding.   Extremities: Moves all extremities  Neuro: Fatigued; Patient is awake, alert, and responsive to voice. No gross motor weaknesses or sensory loss; moves all extremities.    LAB:  All pertinent labs reviewed and interpreted.  Labs Ordered and Resulted from Time of ED Arrival to Time of ED Departure   INFLUENZA A/B, RSV, & SARS-COV2 PCR - Abnormal       Result Value    Influenza A PCR Negative      Influenza B PCR Negative      RSV PCR Negative      SARS CoV2 PCR Positive (*)    COMPREHENSIVE METABOLIC PANEL - Abnormal    Sodium  118 (*)     Potassium 3.9      Chloride 82 (*)     Carbon Dioxide (CO2) 25      Anion Gap 11      Urea Nitrogen 8.3      Creatinine 0.76      Calcium 8.8      Glucose 105 (*)     Alkaline Phosphatase 48      AST 35      ALT 20      Protein Total 7.1      Albumin 4.3      Bilirubin Total 0.3      GFR Estimate >90     LACTIC ACID WHOLE BLOOD - Abnormal    Lactic Acid 0.6 (*)    CBC WITH PLATELETS AND DIFFERENTIAL - Abnormal    WBC Count 5.6      RBC Count 5.25      Hemoglobin 11.0 (*)     Hematocrit 34.8 (*)     MCV 66 (*)     MCH 21.0 (*)     MCHC 31.6      RDW 14.3      Platelet Count 208     DIFFERENTIAL - Abnormal    % Neutrophils 84      % Lymphocytes 10      % Monocytes 6      % Eosinophils 0      % Basophils 0      NRBCs per 100 WBC 1 (*)     Absolute Neutrophils 4.7      Absolute Lymphocytes 0.6 (*)     Absolute Monocytes 0.3      Absolute Eosinophils 0.0      Absolute Basophils 0.0      Absolute NRBCs 0.1 (*)     RBC Morphology Confirmed RBC Indices      Platelet Assessment        Value: Automated Count Confirmed. Platelet morphology is normal.   MAGNESIUM - Normal    Magnesium 1.8     TSH WITH FREE T4 REFLEX - Normal    TSH 2.07     ROUTINE UA WITH MICROSCOPIC REFLEX TO CULTURE   BLOOD CULTURE   BLOOD CULTURE       RADIOLOGY:  Reviewed all pertinent imaging. Please see official radiology report.  Chest XR,  PA & LAT   Final Result   IMPRESSION:   Hyperinflation versus deep inspiration changes. Clinical correlation for air trapping. Heart size and pulmonary vascularity within normal limits. No focal lung infiltrates. Pectus excavatum. Osseous structures grossly intact. Visualized    upper abdomen unremarkable.            PROCEDURES:   Critical Care  Performed by: Coby To PA-C  Authorized by: Coby To PA-C    Total critical care time: 15 minutes  Critical care time was exclusive of separately billable procedures and treating other patients.  Critical care was necessary to treat or prevent imminent  or life-threatening deterioration of the following conditions: Hyponatremia  Critical care was time spent personally by me on the following activities: development of treatment plan with patient or surrogate, discussions with consultants, examination of patient, evaluation of patient's response to treatment, obtaining history from patient or surrogate, ordering and performing treatments and interventions, ordering and review of laboratory studies, ordering and review of radiographic studies and re-evaluation of patient's condition, this excludes any separately billable procedures.      I, Dee Hernandez, am serving as a scribe to document services personally performed by Coby Baldwin PA-C based on my observation and the provider's statements to me. I, Coby Baldwin PA-C attest that Dee Hernandez is acting in a scribe capacity, has observed my performance of the services and has documented them in accordance with my direction.     Coby Baldwin PA-C  Emergency Medicine  [facility] EMERGENCY DEPARTMENT  [address] [place] [id number]  [phone]  Dept: [phone]       Coby Baldwin PA-C  09/23/23 0113

## 2023-09-24 LAB
ANION GAP SERPL CALCULATED.3IONS-SCNC: 8 MMOL/L (ref 7–15)
BUN SERPL-MCNC: 8.2 MG/DL (ref 8–23)
CALCIUM SERPL-MCNC: 8.5 MG/DL (ref 8.8–10.2)
CHLORIDE SERPL-SCNC: 88 MMOL/L (ref 98–107)
CREAT SERPL-MCNC: 0.8 MG/DL (ref 0.67–1.17)
CRP SERPL-MCNC: 26.9 MG/L
D DIMER PPP FEU-MCNC: 0.57 UG/ML FEU (ref 0–0.5)
DEPRECATED HCO3 PLAS-SCNC: 29 MMOL/L (ref 22–29)
EGFRCR SERPLBLD CKD-EPI 2021: >90 ML/MIN/1.73M2
ERYTHROCYTE [DISTWIDTH] IN BLOOD BY AUTOMATED COUNT: 14.5 % (ref 10–15)
GLUCOSE SERPL-MCNC: 95 MG/DL (ref 70–99)
HCT VFR BLD AUTO: 36.9 % (ref 40–53)
HGB BLD-MCNC: 11.7 G/DL (ref 13.3–17.7)
MAGNESIUM SERPL-MCNC: 1.9 MG/DL (ref 1.7–2.3)
MCH RBC QN AUTO: 21.2 PG (ref 26.5–33)
MCHC RBC AUTO-ENTMCNC: 31.7 G/DL (ref 31.5–36.5)
MCV RBC AUTO: 67 FL (ref 78–100)
PLATELET # BLD AUTO: 195 10E3/UL (ref 150–450)
POTASSIUM SERPL-SCNC: 3.6 MMOL/L (ref 3.4–5.3)
RBC # BLD AUTO: 5.52 10E6/UL (ref 4.4–5.9)
SODIUM SERPL-SCNC: 125 MMOL/L (ref 136–145)
SODIUM SERPL-SCNC: 125 MMOL/L (ref 136–145)
SODIUM SERPL-SCNC: 127 MMOL/L (ref 136–145)
WBC # BLD AUTO: 4.2 10E3/UL (ref 4–11)

## 2023-09-24 PROCEDURE — 250N000011 HC RX IP 250 OP 636: Mod: JZ | Performed by: HOSPITALIST

## 2023-09-24 PROCEDURE — 36415 COLL VENOUS BLD VENIPUNCTURE: CPT | Performed by: HOSPITALIST

## 2023-09-24 PROCEDURE — 36415 COLL VENOUS BLD VENIPUNCTURE: CPT | Performed by: INTERNAL MEDICINE

## 2023-09-24 PROCEDURE — 250N000013 HC RX MED GY IP 250 OP 250 PS 637: Performed by: HOSPITALIST

## 2023-09-24 PROCEDURE — 84295 ASSAY OF SERUM SODIUM: CPT | Performed by: INTERNAL MEDICINE

## 2023-09-24 PROCEDURE — 85379 FIBRIN DEGRADATION QUANT: CPT | Performed by: HOSPITALIST

## 2023-09-24 PROCEDURE — 250N000012 HC RX MED GY IP 250 OP 636 PS 637: Performed by: HOSPITALIST

## 2023-09-24 PROCEDURE — 99222 1ST HOSP IP/OBS MODERATE 55: CPT | Performed by: INTERNAL MEDICINE

## 2023-09-24 PROCEDURE — 120N000001 HC R&B MED SURG/OB

## 2023-09-24 PROCEDURE — 80048 BASIC METABOLIC PNL TOTAL CA: CPT | Performed by: HOSPITALIST

## 2023-09-24 PROCEDURE — 250N000013 HC RX MED GY IP 250 OP 250 PS 637: Performed by: INTERNAL MEDICINE

## 2023-09-24 PROCEDURE — 83735 ASSAY OF MAGNESIUM: CPT | Performed by: INTERNAL MEDICINE

## 2023-09-24 PROCEDURE — 99233 SBSQ HOSP IP/OBS HIGH 50: CPT | Performed by: INTERNAL MEDICINE

## 2023-09-24 PROCEDURE — 86140 C-REACTIVE PROTEIN: CPT | Performed by: HOSPITALIST

## 2023-09-24 PROCEDURE — 258N000003 HC RX IP 258 OP 636: Performed by: INTERNAL MEDICINE

## 2023-09-24 PROCEDURE — 85027 COMPLETE CBC AUTOMATED: CPT | Performed by: HOSPITALIST

## 2023-09-24 RX ORDER — POTASSIUM CHLORIDE 1500 MG/1
40 TABLET, EXTENDED RELEASE ORAL ONCE
Status: COMPLETED | OUTPATIENT
Start: 2023-09-24 | End: 2023-09-24

## 2023-09-24 RX ADMIN — ACETAMINOPHEN 650 MG: 325 TABLET ORAL at 13:29

## 2023-09-24 RX ADMIN — LEVOTHYROXINE SODIUM 75 MCG: 0.03 TABLET ORAL at 07:01

## 2023-09-24 RX ADMIN — Medication 1 TABLET: at 09:36

## 2023-09-24 RX ADMIN — PREDNISONE 1.5 MG: 1 TABLET ORAL at 09:34

## 2023-09-24 RX ADMIN — SODIUM CHLORIDE TAB 1 GM 1 G: 1 TAB at 09:36

## 2023-09-24 RX ADMIN — SODIUM CHLORIDE TAB 1 GM 1 G: 1 TAB at 16:55

## 2023-09-24 RX ADMIN — ENOXAPARIN SODIUM 40 MG: 40 INJECTION SUBCUTANEOUS at 09:37

## 2023-09-24 RX ADMIN — POTASSIUM CHLORIDE 40 MEQ: 1500 TABLET, EXTENDED RELEASE ORAL at 09:35

## 2023-09-24 RX ADMIN — SODIUM CHLORIDE 500 ML: 9 INJECTION, SOLUTION INTRAVENOUS at 16:47

## 2023-09-24 RX ADMIN — SODIUM CHLORIDE TAB 1 GM 1 G: 1 TAB at 13:30

## 2023-09-24 RX ADMIN — ACETAMINOPHEN 650 MG: 325 TABLET ORAL at 00:42

## 2023-09-24 ASSESSMENT — ACTIVITIES OF DAILY LIVING (ADL)
ADLS_ACUITY_SCORE: 39
ADLS_ACUITY_SCORE: 41
ADLS_ACUITY_SCORE: 39
ADLS_ACUITY_SCORE: 41

## 2023-09-24 NOTE — PLAN OF CARE
Problem: Plan of Care - These are the overarching goals to be used throughout the patient stay.    Goal: Absence of Hospital-Acquired Illness or Injury  Intervention: Identify and Manage Fall Risk  Recent Flowsheet Documentation  Taken 9/23/2023 1800 by Andria Carbajal RN  Safety Promotion/Fall Prevention: activity supervised  Goal: Optimal Comfort and Wellbeing  Outcome: Progressing     Problem: Risk for Delirium  Goal: Improved Behavioral Control  Intervention: Minimize Safety Risk  Recent Flowsheet Documentation  Taken 9/23/2023 1800 by Andria Carbajal RN  Enhanced Safety Measures: room near unit station  Goal: Improved Attention and Thought Clarity  Outcome: Progressing  Goal: Improved Sleep  Outcome: Progressing  450 ml in this shift, 600 on days.

## 2023-09-24 NOTE — PROGRESS NOTES
[facility]    Medicine Progress Note - Hospitalist Service    Date of Admission:  9/22/2023    Assessment & Plan      Gary Sutherland is a 68 year old male admitted on 9/22/2023. He just came back from [place] cruise trip and came to the ED for evaluation of fever, cough, fatigue x5 days.    # Confirmed COVID-19 infection         Symptom Onset 9/18/2023   Date of 1st Positive Test 9/22/2023   Vaccination Status Fully Vaccinated       - COVID-19 special precautions, continuous pulse-ox  - Oxygen: continue current support with O2 Device: Nasal cannula at Oxygen Delivery: 3 LPM; titrate to keep SpO2 between 90-96%  - Labs: Daily COVID labs ordered x4 days (CBC, BMP, D-dimer, CRP).  Continue to trend after 4 days as needed.   - Imaging: no additional imaging needed at this time  - Breathing treatments: no inhalers needed; avoid nebulizers in favor of MDIs   - IV fluids: NS 1 L bolus given in the ED, hold off additional IV fluids until repeat sodium level, urine osmolality and sodium level as well as cosyntropin test is completed.  If additional IV fluids needed, will use LR.  - Antibiotics: not indicated   - COVID-Focused Medications: Paxlovid started on admission.    - DVT Prophylaxis:         - At high risk of thrombotic complications due to COVID-19 (DDimer = N/A )         -  D-dimer 0.35  -Lovenox 40 mg daily    #Hyponatremia  History of SIADH PTA on fluid restriction, noted meds.  Sodium 118 on admission, improved to 125, then down to 120 last night.  Started on sodium tablets 1 g 3 times daily.  Sodium 125 today.  Urine osmolality 220, indicating he can dilute urine somewhat.  Hyponatremia likely due to ADH release from COVID.  Continue fluid restriction and salt tablets.  Monitor sodium level.  -TSH 2.07, euthyroid, not the culprit  -Has not taken prednisone recently although only takes 1.5 mg daily and it is less likely to disturb hypothalamic pituitary adrenal axis  -Checked cosyntropin  test, after stimulation level 18.7, l close to 20, tinea home dose of prednisone for now.  -Neurology seen patient 9/24, recommendations reviewed    #Giant cell arteritis  -History of temporal arteritis, Raynaud's and ankylosing spondylitis  -On prednisone since 2015  -Resume PTA prednisone 1.5 mg daily unless need for hydrocortisone to treat adrenal insufficiency or dexamethasone to treat COVID-related hypoxia    #Hypoxia  -Only noted when patient is asleep  -Chest x-ray showed hyperinflation versus deep inspiration changes, clinical correlation for air trapping, no focal lung infiltrates  -Oxygen via nasal cannula applied for sleep  -Patient reports snoring, recommended outpatient sleep study    #Hypothyroidism  -Continue PTA levothyroxine    #Hyperlipidemia  -Hold atorvastatin while on Paxlovid    #Elevated blood pressure without hypertension  -Not on PTA medications for blood pressure control although hypertension is listed on problem list  -IV hydralazine as needed    #Microcytic anemia  -History of alpha thalassemia trait  -Monitor for bleeding  -Outpatient work-up     Diet: Combination Diet Regular Diet Adult  Fluid restriction 1200 ML FLUID    DVT Prophylaxis: Anti-embolisim stockings (TEDs) and Ambulate every shift, lovenox  Early Catheter: Not present  Lines: None     Cardiac Monitoring: None  Code Status: Full Code      Clinically Significant Risk Factors         # Hyponatremia: Lowest Na = 118 mmol/L in last 2 days, will monitor as appropriate          # Hypertension: Noted on problem list                   Disposition Plan     Expected Discharge Date: 09/25/2023      Destination: home with family          Kayla Hope MD  Hospitalist Service  [facility]  Securely message with [employer] (more info)  Text page via [employer] Paging/Directory   ______________________________________________________________________    Interval History   Complaining of infrequent productive cough, sore  throat from cough.  Denies chest pain, cardiac palpitations, abdominal pain, nausea.  This afternoon he had another vagal response while in the bathroom.  No LOC.  No fall.  No chest pain, cardiac palpitations, dyspnea.  Hyponatremia improving, started on salt tablets yesterday.  Seen by nephrology, D/W Dr. Dukes.    Physical Exam   Vital Signs: Temp: 98.1  F (36.7  C) Temp src: Oral BP: (!) 163/86 Pulse: 65   Resp: 18 SpO2: 99 % O2 Device: None (Room air) Oxygen Delivery: 1 LPM  Weight: 141 lbs 4.8 oz  General: Alert and oriented x 3. Not in obvious distress.  HEENT: NC, AT. Neck- supple, No JVP elevation, lymphadenopathy or thyromegaly. Trachea-central.  Chest: Clear to auscultation bilaterally.  Heart: S1S2 regular. No M/R/G.  Abdomen: Soft. NT, ND. No organomegaly. Bowel sounds- active.  Back: No spine tenderness. No CVA tenderness.  Extremities: No leg swelling. Peripheral pulses 2+ bilaterally.  Neuro: Cranial nerves 1-12 grossly normal. No focal neurological deficit    Medical Decision Making       50 MINUTES SPENT BY ME on the date of service doing chart review, history, exam, documentation & further activities per the note.      Data     I have personally reviewed the following data over the past 24 hrs:    4.2  \   11.7 (L)   / 195     125 (L); 125 (L) 88 (L) 8.2 /  95   3.6 29 0.80 \     Procal: N/A CRP: 26.90 (H) Lactic Acid: N/A       INR:  N/A PTT:  N/A   D-dimer:  0.57 (H) Fibrinogen:  N/A       Imaging results reviewed over the past 24 hrs:   No results found for this or any previous visit (from the past 24 hour(s)).

## 2023-09-24 NOTE — PROGRESS NOTES
"Care Management Follow Up    Length of Stay (days): 1    Expected Discharge Date: 09/25/2023     Concerns to be Addressed:  covid and hyponatremia       Patient plan of care discussed at interdisciplinary rounds: Yes    Anticipated Discharge Disposition:  TBD     Anticipated Discharge Services:  TBD    Anticipated Discharge DME:  TBD      Additional Information:  Patient presenting with fever, cough, fatigue x5 days. Covid positive. Hyponatremia-Nephrology consulted.   Currently on room air.     Request for PT/OT eval.       Social History:  Gary was found to be COVID +. He lives in a house with his wife. \"I was just caring for my wife because she got sick first. We were on a [place] [employer] [place] in [place] and she got sick at the beginning. I got sick later. She is normally able to be independent, both of us are when we are not sick\".   He is independent with ADLs and IADLs. He drives.  Family to transport at discharge.      9/24/23:  Final discharge plan pending progression and recommendations.         Noris Raman RN      "

## 2023-09-24 NOTE — SIGNIFICANT EVENT
This afternoon Patient had a Vagal response while in the bathroom. Able to bring Patient back to his bed and check vitals which were normal, waiting for a response from the Doctor. Currently Patient is resting comfortably.

## 2023-09-24 NOTE — PLAN OF CARE
"  Problem: Plan of Care - These are the overarching goals to be used throughout the patient stay.    Goal: Plan of Care Review  Description: The Plan of Care Review/Shift note should be completed every shift.  The Outcome Evaluation is a brief statement about your assessment that the patient is improving, declining, or no change.  This information will be displayed automatically on your shift note.  Outcome: Progressing  Flowsheets (Taken 9/24/2023 1108)  Plan of Care Reviewed With: patient  Goal: Patient-Specific Goal (Individualized)  Description: You can add care plan individualizations to a care plan. Examples of Individualization might be:  \"Parent requests to be called daily at 9am for status\", \"I have a hard time hearing out of my right ear\", or \"Do not touch me to wake me up as it startles me\".  Outcome: Progressing  Goal: Absence of Hospital-Acquired Illness or Injury  Outcome: Progressing  Intervention: Identify and Manage Fall Risk  Recent Flowsheet Documentation  Taken 9/24/2023 0900 by Alvin Brooks, RN  Safety Promotion/Fall Prevention:   activity supervised   clutter free environment maintained  Intervention: Prevent Skin Injury  Recent Flowsheet Documentation  Taken 9/24/2023 0900 by Alvin Brooks, RN  Body Position: position changed independently  Goal: Readiness for Transition of Care  Outcome: Progressing     Problem: Risk for Delirium  Goal: Improved Sleep  Outcome: Progressing   Goal Outcome Evaluation:      Plan of Care Reviewed With: patient                 "

## 2023-09-24 NOTE — UTILIZATION REVIEW
[facility] Utilization Review  Admission Status; Secondary Review Determination     Admission Date: 9/22/2023  8:46 PM      Under the authority of the Utilization Management Committee, the utilization review process indicated a secondary review on the above patient.  The review outcome is based on review of the medical records, discussions with staff, and applying clinical experience noted on the date of the review.        (X)      Inpatient Status Appropriate - This patient's medical care is consistent with medical management for inpatient care and reasonable inpatient medical practice.          RATIONALE FOR DETERMINATION   Gary Sutherland is a 68 year old male complex past medical history including SIADH, hypothyroidism, giant cell arteritis, who presented with fever, cough after a trip to [place].  He is admitted with acute COVID-19 infection with hypoxia mostly at nighttime, severe hyponatremia (sodium 118 mEq).  He is started on oral antivirals and supplemental oxygen, IV fluids, salt tablets and close monitoring and work-up of sodium levels with nephrology consultation.  Currently undergoing work-up and slow correction of hyponatremia with plan for recheck sodium in a.m. after salt tablets under supervision of nephrology consultation.  In light of complexity of care due to advanced age, significant comorbidities and multiple issues on admission with anticipated length of stay more than 2 midnights, high risk of adverse outcome, criteria for inpatient admission is met.      The information on this document is developed by the utilization review team in order for the business office to ensure compliance.  This only denotes the appropriateness of proper admission status and does not reflect the quality of care rendered.              Sincerely,       Afia De La Rosa MD, MS  Physician Advisor  Utilization Review-[place]    Phone: [phone]

## 2023-09-24 NOTE — PLAN OF CARE
"  Problem: Plan of Care - These are the overarching goals to be used throughout the patient stay.    Goal: Plan of Care Review  Description: The Plan of Care Review/Shift note should be completed every shift.  The Outcome Evaluation is a brief statement about your assessment that the patient is improving, declining, or no change.  This information will be displayed automatically on your shift note.  Outcome: Progressing  Goal: Patient-Specific Goal (Individualized)  Description: You can add care plan individualizations to a care plan. Examples of Individualization might be:  \"Parent requests to be called daily at 9am for status\", \"I have a hard time hearing out of my right ear\", or \"Do not touch me to wake me up as it startles me\".  Outcome: Progressing  Goal: Absence of Hospital-Acquired Illness or Injury  Outcome: Progressing  Intervention: Identify and Manage Fall Risk  Recent Flowsheet Documentation  Taken 9/24/2023 0027 by Joanne Sotomayor, RN  Safety Promotion/Fall Prevention: activity supervised  Goal: Optimal Comfort and Wellbeing  Outcome: Progressing  Goal: Readiness for Transition of Care  Outcome: Progressing   Goal Outcome Evaluation:      Pt c/o low back discomfort 5/10. Tylenol given. Oxygen saturation dropping into 70s and 80s when sleeping. 2L O2 applied.  Orthostatic bps negative.                  "

## 2023-09-24 NOTE — CONSULTS
RENAL CONSULT NOTE    REQUESTING PHYSICIAN: Dr. Hope    REASON FOR CONSULT: Hyponatremia    ASSESSMENT/PLAN:  Hyponatremia - chronic tendency to hyponatremia, not on any particular medications that should cause this.  Urine osm was high for a sodium of 133 in 11/2022 at [facility].  However here his urine osm was only 220.  He pretty clearly doesn't have a fixed urine osm and can dilute his urine to some degree.  Likely some degree of increased water intake prior to admission plus excess ADH expression from COVID.  He has corrected at an acceptable rate.  I'm slightly suspicious the sodium of 120 was spurious, we don't usual see a 5pt fall and rise, even with starting salt tabs.    Recs:  - agree with fluid restriction and salt tabs as ordered  - not ideal to use salt tabs in the longer term, if correcting to normal levels tomorrow, may not need on discharge  - unlikely to correct too quickly at this point, okay to follow sodium tomorrow am    COVID-19 - being treated with Paxlovid and enoxaparin.  Defer further therapies to [facility].  Some hypoxia while asleep    Elevated BP - some mentions of HTN on his list, not on therapies prior to admit.  May be developing HTN as he ages, recommend following, may well need therapy    GCA - on very low dose pred, I discussed that his elevated CRP seems more likely related to COVID than a GCA flare    Nocturia - prior negative cystoscopy with Urology, presumed BPH, recommended double voiding    History of Hematuria - worked up by Urology per patient, negative cystoscopy    Hypothyroidism - on replacement        HPI: Mr. Sutherland is a 68 year old gentleman with a past medical history significant for HLD, hypothyroidism, giant cell arteritis, history of alpha thalassemia and hyponatremia.  He presented to [facility] ED late on 9/22 for fatigue and cough.  Found to be febrile to 100.2.  Testing notable for positive COVID test as well as acute hyponatremia of 118. Admitted early  "yesterday for further treatment.  Nephrology consulted for management of hyponatremia.    Initially sodium correcting appropriately to 126 by yesterday morning (perhaps slightly fast) but dropped to 120 yesterday afternoon.  125 this morning. He was started on salt tabs yesterday evening when he sodium fell.      On interview, Gary is feeling better but still \"foggy\".  Having trouble remembering specific events.  He knows he's had trouble with sodium many times in the past and thought was hospitalized here although the last hospital stay I can find was in 2015 a [facility] with hyponatremia.  Sodium earlier this year in March was 136.      He was in [place] prior to admission and not really following his fluid restriction while there, he was worried about being dehydrated but thinks he was likely drinking much closer to 90-100oz of fluid.      Reviewed Nephrology consultation at [facility] in late 2022.  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  In November 2022, urine osm was 494 with a urine sodium of 110 and a serum sodium of 133.  He was advised to follow a 60oz fluid restriction with higher protein intake.      He has a number of questions as well about his inflammatory markers (these are following frequently for his GCA I believe).  I discussed that they often rise with COVID.  He also asks about a potential sinus infection as well as nocturia.  He's had prior Urologic evaluate for BPH and hematuria with cystoscopy.  I advised he try double voiding prior to starting a medication for it.      REVIEW OF SYSTEMS: a 12 point review of systems was reviewed and negative other than noted in the HPI above.    Past medical history  HLD  Hypothyroidism  Giant Cell arteritis  Hyponatremia  Alpha thalassemia      No current facility-administered medications on file prior to encounter.  atorvastatin (LIPITOR) 10 MG tablet, [ATORVASTATIN (LIPITOR) 10 MG TABLET] Take 10 mg by mouth " daily with lunch.   cholecalciferol, vitamin D3, 1,000 unit tablet, Take 1,000 Units by mouth every other day  levothyroxine (SYNTHROID, LEVOTHROID) 75 MCG tablet, [LEVOTHYROXINE (SYNTHROID, LEVOTHROID) 75 MCG TABLET] Take 75 mcg by mouth Daily at 6:00 am.   Multiple Vitamins-Minerals (MULTIVITAMIN MEN 50+ PO), Take 1 tablet by mouth daily  predniSONE (DELTASONE) 1 MG tablet, Take 1.5 mg by mouth daily        No current outpatient medications on file.      ALLERGIES/SENSITIVITIES:  No Known Allergies  Social History     Tobacco Use    Smoking status: Never    Smokeless tobacco: Never   Substance Use Topics    Alcohol use: Yes    Drug use: No     I have reviewed this patient's family history and updated it with pertinent information if needed.  Family History   Problem Relation Age of Onset    Heart Disease Mother     Thyroid Disease Mother     Anemia Mother     Coronary Artery Disease Father     Breast Cancer Sister     Skin Cancer Sister     Thyroid Disease Sister     Cerebrovascular Disease Brother          PHYSICAL EXAM:  Physical Exam   Temp: 98.1  F (36.7  C) Temp src: Oral BP: (!) 163/86 Pulse: 65   Resp: 18 SpO2: 99 % O2 Device: None (Room air) Oxygen Delivery: 1 LPM  Vitals:    09/22/23 1931   Weight: 64.1 kg (141 lb 4.8 oz)     Vital Signs with Ranges  Temp:  [98.1  F (36.7  C)-100  F (37.8  C)] 98.1  F (36.7  C)  Pulse:  [65-84] 65  Resp:  [15-30] 18  BP: (116-167)/(64-90) 163/86  SpO2:  [98 %-100 %] 99 %  I/O last 3 completed shifts:  In: 1050 [P.O.:1050]  Out: 3450 [Urine:3450]    @TMAXR(24)@    Patient Vitals for the past 72 hrs:   Weight   09/22/23 1931 64.1 kg (141 lb 4.8 oz)       General - A & O x 3, NAD, sitting up in room  HEENT - PERRLA, no scleral icterus  Neck - no carotid bruits, no JVD  Respiratory - Lungs CTA bilat without wheeze, rhonchi, rales  Cardiovascular - AP RRR with murmur  Abdomen - soft, BS present, no bruits, no fluid wave  Extremities - well perfused, no edema  Integumentary -  intact, good turgor, no rash/lesions  Neurologic - grossly intact  Psych:  Judgement intact, affect WNL  :  no shoemaker    Laboratory:     Recent Labs   Lab 09/24/23 0541 09/22/23 2145   WBC 4.2 5.6   RBC 5.52 5.25   HGB 11.7* 11.0*   HCT 36.9* 34.8*    208       Basic Metabolic Panel:  Recent Labs   Lab 09/24/23  0541 09/23/23  1755 09/23/23  1410 09/23/23  1017 09/23/23  0606 09/23/23  0251 09/22/23 2145   *  125* 120*  --  126* 122* 122* 118*   POTASSIUM 3.6  --   --   --   --   --  3.9   CHLORIDE 88*  --   --   --   --   --  82*   CO2 29  --   --   --   --   --  25   BUN 8.2  --   --   --   --   --  8.3   CR 0.80  --   --   --   --   --  0.76   GLC 95  --  134*  --   --   --  105*   SHARONDA 8.5*  --   --   --   --   --  8.8       INR  Recent Labs   Lab 09/22/23 2145   INR 1.03       Recent Labs   Lab Test 09/24/23 0541 09/22/23 2145   POTASSIUM 3.6 3.9   CHLORIDE 88* 82*   BUN 8.2 8.3      Recent Labs   Lab Test 09/23/23  0018 09/22/23 2145   ALBUMIN  --  4.3   BILITOTAL  --  0.3   ALT  --  20   AST  --  35   PROTEIN Negative  --        Personally reviewed today's laboratory studies      Thank you for involving us in the care of this patient. We will continue to follow along with you.      Madi Villarreal  [facility]  [phone]

## 2023-09-25 ENCOUNTER — APPOINTMENT (OUTPATIENT)
Dept: OCCUPATIONAL THERAPY | Facility: HOSPITAL | Age: 68
DRG: 178 | End: 2023-09-25
Attending: INTERNAL MEDICINE
Payer: COMMERCIAL

## 2023-09-25 ENCOUNTER — APPOINTMENT (OUTPATIENT)
Dept: PHYSICAL THERAPY | Facility: HOSPITAL | Age: 68
DRG: 178 | End: 2023-09-25
Attending: INTERNAL MEDICINE
Payer: COMMERCIAL

## 2023-09-25 PROBLEM — I95.1 ORTHOSTATIC HYPOTENSION: Status: ACTIVE | Noted: 2023-09-25

## 2023-09-25 LAB
ANION GAP SERPL CALCULATED.3IONS-SCNC: 9 MMOL/L (ref 7–15)
BUN SERPL-MCNC: 9.3 MG/DL (ref 8–23)
CALCIUM SERPL-MCNC: 8.4 MG/DL (ref 8.8–10.2)
CHLORIDE SERPL-SCNC: 88 MMOL/L (ref 98–107)
CREAT SERPL-MCNC: 0.79 MG/DL (ref 0.67–1.17)
CRP SERPL-MCNC: 39.1 MG/L
D DIMER PPP FEU-MCNC: 0.39 UG/ML FEU (ref 0–0.5)
DEPRECATED HCO3 PLAS-SCNC: 30 MMOL/L (ref 22–29)
EGFRCR SERPLBLD CKD-EPI 2021: >90 ML/MIN/1.73M2
ERYTHROCYTE [DISTWIDTH] IN BLOOD BY AUTOMATED COUNT: 14.6 % (ref 10–15)
GLUCOSE SERPL-MCNC: 102 MG/DL (ref 70–99)
HCT VFR BLD AUTO: 38.5 % (ref 40–53)
HGB BLD-MCNC: 11.7 G/DL (ref 13.3–17.7)
MCH RBC QN AUTO: 20.6 PG (ref 26.5–33)
MCHC RBC AUTO-ENTMCNC: 30.4 G/DL (ref 31.5–36.5)
MCV RBC AUTO: 68 FL (ref 78–100)
OSMOLALITY SERPL: 256 MMOL/KG (ref 280–301)
PLATELET # BLD AUTO: 210 10E3/UL (ref 150–450)
POTASSIUM SERPL-SCNC: 4 MMOL/L (ref 3.4–5.3)
RBC # BLD AUTO: 5.69 10E6/UL (ref 4.4–5.9)
SODIUM SERPL-SCNC: 127 MMOL/L (ref 136–145)
SODIUM SERPL-SCNC: 127 MMOL/L (ref 136–145)
WBC # BLD AUTO: 5.9 10E3/UL (ref 4–11)

## 2023-09-25 PROCEDURE — 250N000012 HC RX MED GY IP 250 OP 636 PS 637: Performed by: HOSPITALIST

## 2023-09-25 PROCEDURE — 250N000013 HC RX MED GY IP 250 OP 250 PS 637: Performed by: INTERNAL MEDICINE

## 2023-09-25 PROCEDURE — 120N000001 HC R&B MED SURG/OB

## 2023-09-25 PROCEDURE — 84295 ASSAY OF SERUM SODIUM: CPT | Performed by: INTERNAL MEDICINE

## 2023-09-25 PROCEDURE — 86140 C-REACTIVE PROTEIN: CPT | Performed by: HOSPITALIST

## 2023-09-25 PROCEDURE — 97110 THERAPEUTIC EXERCISES: CPT | Mod: GO

## 2023-09-25 PROCEDURE — 97161 PT EVAL LOW COMPLEX 20 MIN: CPT | Mod: GP | Performed by: PHYSICAL THERAPIST

## 2023-09-25 PROCEDURE — 99232 SBSQ HOSP IP/OBS MODERATE 35: CPT | Performed by: INTERNAL MEDICINE

## 2023-09-25 PROCEDURE — 85379 FIBRIN DEGRADATION QUANT: CPT | Performed by: HOSPITALIST

## 2023-09-25 PROCEDURE — 85027 COMPLETE CBC AUTOMATED: CPT | Performed by: HOSPITALIST

## 2023-09-25 PROCEDURE — 97535 SELF CARE MNGMENT TRAINING: CPT | Mod: GO

## 2023-09-25 PROCEDURE — 250N000011 HC RX IP 250 OP 636: Mod: JZ | Performed by: HOSPITALIST

## 2023-09-25 PROCEDURE — 83930 ASSAY OF BLOOD OSMOLALITY: CPT | Performed by: INTERNAL MEDICINE

## 2023-09-25 PROCEDURE — 97530 THERAPEUTIC ACTIVITIES: CPT | Mod: GP | Performed by: PHYSICAL THERAPIST

## 2023-09-25 PROCEDURE — 99233 SBSQ HOSP IP/OBS HIGH 50: CPT | Performed by: INTERNAL MEDICINE

## 2023-09-25 PROCEDURE — 36415 COLL VENOUS BLD VENIPUNCTURE: CPT | Performed by: HOSPITALIST

## 2023-09-25 PROCEDURE — 250N000013 HC RX MED GY IP 250 OP 250 PS 637: Performed by: HOSPITALIST

## 2023-09-25 PROCEDURE — 97165 OT EVAL LOW COMPLEX 30 MIN: CPT | Mod: GO

## 2023-09-25 RX ORDER — AMLODIPINE BESYLATE 5 MG/1
5 TABLET ORAL AT BEDTIME
Status: DISCONTINUED | OUTPATIENT
Start: 2023-09-25 | End: 2023-09-26 | Stop reason: HOSPADM

## 2023-09-25 RX ADMIN — Medication 25 MCG: at 08:33

## 2023-09-25 RX ADMIN — ACETAMINOPHEN 650 MG: 325 TABLET ORAL at 22:18

## 2023-09-25 RX ADMIN — ENOXAPARIN SODIUM 40 MG: 40 INJECTION SUBCUTANEOUS at 08:33

## 2023-09-25 RX ADMIN — Medication 1 TABLET: at 08:33

## 2023-09-25 RX ADMIN — AMLODIPINE BESYLATE 5 MG: 5 TABLET ORAL at 21:14

## 2023-09-25 RX ADMIN — SODIUM CHLORIDE TAB 1 GM 1 G: 1 TAB at 08:33

## 2023-09-25 RX ADMIN — SODIUM CHLORIDE TAB 1 GM 1 G: 1 TAB at 11:46

## 2023-09-25 RX ADMIN — PREDNISONE 1.5 MG: 1 TABLET ORAL at 08:33

## 2023-09-25 RX ADMIN — ACETAMINOPHEN 650 MG: 325 TABLET ORAL at 00:50

## 2023-09-25 RX ADMIN — LEVOTHYROXINE SODIUM 75 MCG: 0.03 TABLET ORAL at 06:51

## 2023-09-25 ASSESSMENT — ACTIVITIES OF DAILY LIVING (ADL)
ADLS_ACUITY_SCORE: 41
ADLS_ACUITY_SCORE: 28
ADLS_ACUITY_SCORE: 41
ADLS_ACUITY_SCORE: 29
CONCENTRATING,_REMEMBERING_OR_MAKING_DECISIONS_DIFFICULTY: NO
DRESSING/BATHING_DIFFICULTY: NO
ADLS_ACUITY_SCORE: 41
ADLS_ACUITY_SCORE: 29
WALKING_OR_CLIMBING_STAIRS_DIFFICULTY: NO
TOILETING_ISSUES: NO
ADLS_ACUITY_SCORE: 41
PREVIOUS_RESPONSIBILITIES: DRIVING;YARDWORK
DOING_ERRANDS_INDEPENDENTLY_DIFFICULTY: NO
ADLS_ACUITY_SCORE: 44
WEAR_GLASSES_OR_BLIND: YES
ADLS_ACUITY_SCORE: 28
ADLS_ACUITY_SCORE: 29
FALL_HISTORY_WITHIN_LAST_SIX_MONTHS: NO
ADLS_ACUITY_SCORE: 44
VISION_MANAGEMENT: GLASSES
CHANGE_IN_FUNCTIONAL_STATUS_SINCE_ONSET_OF_CURRENT_ILLNESS/INJURY: NO
DIFFICULTY_EATING/SWALLOWING: NO
ADLS_ACUITY_SCORE: 41

## 2023-09-25 NOTE — PROGRESS NOTES
[facility]    Medicine Progress Note - Hospitalist Service    Date of Admission:  9/22/2023    Assessment & Plan      Gary Sutherland is a 68 year old male admitted on 9/22/2023. He just came back from [place] cruise trip and came to the ED for evaluation of fever, cough, fatigue x5 days.    # Confirmed COVID-19 infection         Symptom Onset 9/18/2023   Date of 1st Positive Test 9/22/2023   Vaccination Status Fully Vaccinated       - COVID-19 special precautions, continuous pulse-ox  - Oxygen: continue current support with O2 Device: Nasal cannula at Oxygen Delivery: 3 LPM; titrate to keep SpO2 between 90-96%  - Labs: Daily COVID labs ordered x4 days (CBC, BMP, D-dimer, CRP).  Continue to trend after 4 days as needed.   - Imaging: no additional imaging needed at this time  - Breathing treatments: no inhalers needed; avoid nebulizers in favor of MDIs   - IV fluids: NS 1 L bolus given in the ED, hold off additional IV fluids until repeat sodium level, urine osmolality and sodium level as well as cosyntropin test is completed.  If additional IV fluids needed, will use LR.  - Antibiotics: not indicated   - COVID-Focused Medications: Paxlovid started on admission.    - DVT Prophylaxis:         - At high risk of thrombotic complications due to COVID-19 (DDimer = N/A )         -  D-dimer 0.35  -Lovenox 40 mg daily    #Hyponatremia  History of SIADH PTA on fluid restriction, noted meds.  Sodium 118 on admission, improved to 125, then down to 120 last night.  Started on sodium tablets 1 g 3 times daily on 9/23.  Sodium improving and is 127 today.  Urine osmolality 220, indicating he can dilute urine somewhat.  Hyponatremia likely due to ADH release from COVID.  Continue fluid restriction 1200.  9/25 salt tablets discontinued, per nephrology recommendations.  Monitor sodium level every 12 hours.  Serum osmolality pending  -TSH 2.07, euthyroid, not the culprit  -Has not taken prednisone recently  although only takes 1.5 mg daily and it is less likely to disturb hypothalamic pituitary adrenal axis  -Checked cosyntropin test, after stimulation level 18.7, l close to 20, c/w home dose of prednisone for now.  -Nephrology following    #Giant cell arteritis  -History of temporal arteritis, Raynaud's and ankylosing spondylitis  -On prednisone since 2015, very low-dose of 1.5 mg, and symptom stable.  Will decrease dose to 1 mg, [place]are[place].    #Hypoxia  -Only noted when patient is asleep  -Chest x-ray showed hyperinflation versus deep inspiration changes, clinical correlation for air trapping, no focal lung infiltrates  -Supplemental O2 to keep SPO2 above 92% hypertensive during hospitalization, with episode/hypotension/  -Patient reports snoring, recommended outpatient sleep study    #Hypothyroidism  -Continue PTA levothyroxine    #Hyperlipidemia  -Hold atorvastatin while on Paxlovid    #Elevated blood pressure without hypertension  -Not on PTA medications for blood pressure control although hypertension is listed on problem list  -Hypertensive during hospitalization with episodes of orthostatic hypotension 9/24 with episode of near syncope. 9/25 negative orthostatic hypotension.  BP persistently elevated.  - 9/25 started on low-dose 5 mg amlodipine at bedtime.  -IV hydralazine as needed    #Microcytic anemia  -History of alpha thalassemia trait  -Monitor for bleeding  -Outpatient work-up     Diet: Combination Diet Regular Diet Adult  Fluid restriction 1200 ML FLUID    DVT Prophylaxis: Anti-embolisim stockings (TEDs) and Ambulate every shift, lovenox  Early Catheter: Not present  Lines: None     Cardiac Monitoring: None  Code Status: Full Code      Clinically Significant Risk Factors         # Hyponatremia: Lowest Na = 120 mmol/L in last 2 days, will monitor as appropriate            # Hypertension: Noted on problem list                Disposition Plan   Expected Discharge Date: 09/25/2023      Destination: home with  family          Kayla Hope MD  Hospitalist Service  [facility]  Securely message with [employer] (more info)  Text page via [facility] Paging/Directory   ______________________________________________________________________    Interval History   Complaining of infrequent dry cough.  No arthralgia, myalgia, chest pain, cardiac ablation, abdominal pain, nausea, dysuria.  No recurrence of syncope or near syncope.  Tested positive orthostatic hypotension yesterday, negative today.  Seen by nephrology, D/W Dr. Gates.  Not ready for discharge today, likely will be ready tomorrow.    Physical Exam   Vital Signs: Temp: 98  F (36.7  C) Temp src: Oral BP: (!) 163/75 Pulse: 69   Resp: 20 SpO2: 98 % O2 Device: None (Room air) Oxygen Delivery: 2 LPM  Weight: 141 lbs 4.8 oz  General: Alert and oriented x 3. Not in obvious distress.  HEENT: NC, AT. Neck- supple, No JVP elevation, lymphadenopathy or thyromegaly. Trachea-central.  Chest: Clear to auscultation bilaterally.  Heart: S1S2 regular. No M/R/G.  Abdomen: Soft. NT, ND. No organomegaly. Bowel sounds- active.  Back: No spine tenderness. No CVA tenderness.  Extremities: No leg swelling. Peripheral pulses 2+ bilaterally.  Neuro: Cranial nerves 1-12 grossly normal. No focal neurological deficit    Medical Decision Making       45 MINUTES SPENT BY ME on the date of service doing chart review, history, exam, documentation & further activities per the note.      Data   I have personally reviewed the following data over the past 24 hrs:    5.9  \   11.7 (L)   / 210     127 (L); 127 (L) 88 (L) 9.3 /  102 (H)   4.0 30 (H) 0.79 \     Procal: N/A CRP: 39.10 (H) Lactic Acid: N/A       INR:  N/A PTT:  N/A   D-dimer:  0.39 Fibrinogen:  N/A     Imaging results reviewed over the past 24 hrs:   No results found for this or any previous visit (from the past 24 hour(s)).

## 2023-09-25 NOTE — PLAN OF CARE
Goal Outcome Evaluation:      Problem: Gas Exchange Impaired  Goal: Optimal Gas Exchange  Intervention: Optimize Oxygenation and Ventilation  Recent Flowsheet Documentation  Taken 9/25/2023 3799 by Kenia Tracy RN  Head of Bed (HOB) Positioning: HOB at 30-45 degrees     Problem: Electrolyte Imbalance  Goal: Electrolyte Imbalance: Plan of Care  Outcome: Progressing     Patient is pleasant, cooperative, alert and oriented x 4.  Mild chronic back pain reported, declined intervention.  No SOB, on room air, sats in upper 90's.  Intermittent dry, non-productive cough.  Sodium level 127.  Complaint with fluid restriction.  Taking sodium tablets.  Denies any lightheadedness or dizziness when up and moving today.  Continues on COVID precautions. Continue plan of care.

## 2023-09-25 NOTE — PLAN OF CARE
"  Problem: Plan of Care - These are the overarching goals to be used throughout the patient stay.    Goal: Patient-Specific Goal (Individualized)  Description: You can add care plan individualizations to a care plan. Examples of Individualization might be:  \"Parent requests to be called daily at 9am for status\", \"I have a hard time hearing out of my right ear\", or \"Do not touch me to wake me up as it startles me\".  Outcome: Progressing     Problem: Plan of Care - These are the overarching goals to be used throughout the patient stay.    Goal: Absence of Hospital-Acquired Illness or Injury  Outcome: Progressing  Intervention: Identify and Manage Fall Risk  Recent Flowsheet Documentation  Taken 9/25/2023 0134 by Diana Cisneros RN  Safety Promotion/Fall Prevention: room near nurse's station  Intervention: Prevent Skin Injury  Recent Flowsheet Documentation  Taken 9/25/2023 0134 by Diana Cisneros RN  Body Position: position changed independently   Goal Outcome Evaluation:       Pt is A/O X4 , denied pain, voiding spontaneously using a primofit, infrequent cough, room air sating 98/99%, continuous pulse ox, VSS, called the nurse wanting to know what times doctors are making rounds and how to keep track of fluid intake. On a 1200 ml fluid restriction, no episodes of syncope this shift. Sodium stayed at 127 .                 "

## 2023-09-25 NOTE — PLAN OF CARE
Problem: Plan of Care - These are the overarching goals to be used throughout the patient stay.    Goal: Absence of Hospital-Acquired Illness or Injury  Outcome: Progressing  Intervention: Identify and Manage Fall Risk  Recent Flowsheet Documentation  Taken 9/24/2023 1815 by Andria Carbajal RN  Safety Promotion/Fall Prevention: activity supervised  Goal: Readiness for Transition of Care  Outcome: Progressing     Problem: Risk for Delirium  Goal: Improved Behavioral Control  Intervention: Minimize Safety Risk  Recent Flowsheet Documentation  Taken 9/24/2023 1815 by Andria Carbajal RN  Enhanced Safety Measures: room near unit station  Goal: Improved Sleep  Outcome: Progressing  On RA, satting 97%. Orthostatic VS completed, see MAR. Sent FYI to Dr. Rosario. NNO.

## 2023-09-25 NOTE — PROGRESS NOTES
"   09/25/23 3211   Appointment Info   Signing Clinician's Name / Credentials (PT) Emelyn Holt, PT, DPT   Living Environment   People in Home spouse   Current Living Arrangements house   Home Accessibility stairs to enter home;stairs within home   Number of Stairs, Main Entrance 4   Stair Railings, Main Entrance   (block wall half way up)   Number of Stairs, Within Home, Primary greater than 10 stairs   Stair Railings, Within Home, Primary railings safe and in good condition   Transportation Anticipated family or friend will provide   Self-Care   Equipment Currently Used at Home none   Fall history within last six months no   Activity/Exercise/Self-Care Comment Patient independent with all ADLs/IADLs at baseline.   General Information   Onset of Illness/Injury or Date of Surgery 09/22/23   Referring Physician Kayla Hope MD   Patient/Family Therapy Goals Statement (PT) None stated.   Pertinent History of Current Problem (include personal factors and/or comorbidities that impact the POC) Per H&P: \"68 year old male admitted on 9/22/2023. He just came back from [place] cruise trip and came to the ED for evaluation of fever, cough, fatigue x5 days.\"   Existing Precautions/Restrictions fall;other (see comments)  (special precautions)   Range of Motion (ROM)   Range of Motion ROM is WFL   ROM Comment limited knee extension bilaterally   Strength (Manual Muscle Testing)   Strength (Manual Muscle Testing) strength is WFL   Bed Mobility   Bed Mobility supine-sit;sit-supine   Supine-Sit [place] (Bed Mobility) independent   Sit-Supine [place] (Bed Mobility) independent   Transfers   Transfers sit-stand transfer   Sit-Stand Transfer   Sit-Stand [place] (Transfers) supervision   Gait/Stairs (Locomotion)   [place] Level (Gait) contact guard   Assistive Device (Gait) other (see comments)  (no device)   Distance in Feet 20'   Pattern (Gait) step-to   Deviations/Abnormal Patterns (Gait) gait speed " decreased;yasir decreased   Comment, (Gait/Stairs) Patient has several stairs to complete at home. Not assessed during evaluation due to COVID precautions and lack of step stool present.   Clinical Impression   Criteria for Skilled Therapeutic Intervention Yes, treatment indicated   PT Diagnosis (PT) impaired functional mobility   Influenced by the following impairments decreased strength, impaired balance, dizziness   Functional limitations due to impairments transfers, ambulation   Clinical Presentation (PT Evaluation Complexity) Stable/Uncomplicated   Clinical Presentation Rationale Pt presents as medically diagnosed.   Clinical Decision Making (Complexity) low complexity   Planned Therapy Interventions (PT) balance training;bed mobility training;gait training;home exercise program;neuromuscular re-education;patient/family education;strengthening;transfer training;stair training   Risk & Benefits of therapy have been explained evaluation/treatment results reviewed;participants voiced agreement with care plan;participants included;patient   PT Total Evaluation Time   PT Eval, Low Complexity Minutes (41325) 10   Physical Therapy Goals   PT Frequency Daily   PT Predicted Duration/Target Date for Goal Attainment 10/02/23   PT Goals Bed Mobility;Transfers;Gait;Stairs   PT: Bed Mobility Supervision/stand-by assist;Supine to/from sit   PT: Transfers Supervision/stand-by assist;Sit to/from stand   PT: Gait Supervision/stand-by assist;150 feet   PT: Stairs Supervision/stand-by assist;Greater than 10 stairs;Rail on right   Interventions   Interventions Quick Adds Therapeutic Activity   Therapeutic Activity   Therapeutic Activities: dynamic activities to improve functional performance Minutes (26162) 23   Symptoms Noted During/After Treatment None   Treatment Detail/Skilled Intervention Patient denies dizziness with transfer to EOB. While sitting EOB, educated pt on B LE exercise for circulation prior to transferring to  standing position. After first walk of 20', patient's BP is 149/86 mm Hg. Pt denies dizziness throughout mobility. Pt completes second walk around room of 80' without device, CGA. Patient frequently reaching out for bed frame for stability and safety due to recent syncopal episodes. BP after second walk 149/79 mm Hg. RN notified. Pt supine in bed at end of session, call light in reach, bed alarm engaged.   PT Discharge Planning   PT Plan gait without device in room, bring step stool for stairs (2nd level), standing ex? vs balance activities   PT Discharge Recommendation (DC Rec) home with assist   PT Rationale for DC Rec Patient is moving well with stand-by to contact guard assist of 1. Anticipate pt will be safe to discharge home when medically ready.   PT Brief overview of current status Supine <> sit, independent. Sit <> stand, SBA. Ambulates 80' without device, CGA.   Total Session Time   Timed Code Treatment Minutes 23   Total Session Time (sum of timed and untimed services) 33

## 2023-09-25 NOTE — PROGRESS NOTES
RENAL CONSULT NOTE      ASSESSMENT/PLAN:  This is a 68 year old gentleman with a past medical history significant for HLD, hypothyroidism, giant cell arteritis, ankylosing spondylitis,  alpha thalassemia and chronic hyponatremia.  He presented to [facility] ED late on 9/22 for fatigue and cough.  Found to be febrile to 100.2.  Testing notable for positive COVID test as well as acute hyponatremia of 118. Admitted early yesterday for further treatment.  Nephrology is following for management of hyponatremia.      Hyponatremia - chronic tendency to hyponatremia, not on any particular medications that should cause this.  Urine osm was high for a sodium of 133 in 11/2022 at [facility].  However here his urine osm was only 220.  He pretty clearly doesn't have a fixed urine osm and can dilute his urine to some degree. His acute hyponatremia is likely 2/2 some degree of increased water intake prior to admission plus excess ADH expression from COVID  Patient had a borderline low cortisol level with hypertension.   TSH was wnl in 11/2022 at [facility].  Patient serum sodium was 136 in March 2023.  Patient presents serum sodium of 118.  Today serum sodium is trending up from 125 to 127. The rate of correction is appropriate. Recs:  -check serum Osm  -continue 1200 ml fluid restriction   - not ideal to use salt tabs in the longer term and  may not need on discharge.  -I also recommend to increase his protein intake to add protein bars as snacks  - monitor serum sodium daily but unlikely to correct too quickly at this point. Okay to discharge from renal stand point if serum sodium is 130 or greater tomorrow.    COVID-19 - being treated with Paxlovid and enoxaparin.  Defer further therapies to [facility].  Some hypoxia while asleep    Elevated BP -BP is elevated at 163/75 mmHG this am. Patient reports history of white coat hypertension. Patient states that he checks his BP daily at home and it has been running in 140s/90s.  Recommend to start  Amlodipine 5 mg daily at bedtime.    GCA - on very low dose prednisone  1.5 mg daily, I discussed that his elevated CRP seems more likely related to COVID than a GCA flare    Nocturia - prior negative cystoscopy with Urology, presumed BPH, recommended double voiding    History of chronic intermittent hematuria - worked up by Urology per patient, negative cystoscopy.  His kidney function is normal.  No proteinuria.  Pending negative extensive work-up for GM at [facility].  Patient might have a mild form of IgA nephropathy.    Hypothyroidism - on replacement    Discussed with .      We will follow      Subjective:  No acute issues overnight. Patient states that he is feeling much better, generalized weakness and dizziness improved. Reports good appetite.  Patient reports history of hyponatremia for at least last 10 years. Pateint states that was in [place] prior to admission and not really following his fluid restriction while there, he was worried about being dehydrated but thinks he was likely drinking much closer to 90-100oz of fluid.  Patient stated he usually eats 3 meals per day and follows vegetarian and low-sodium diet.  Patient denies chronic pain, chronic nausea.  Patient denies: fever, chills, cough, shortness of breath , chest pain, palpitations, orthopnea, nausea, vomiting, abdominal pain, changes in bowel habits, dysuria, urinary frequency, urgency, hematuria, rash.  Updated on labs. All questions answered.      PHYSICAL EXAM:  Physical Exam   Temp: 98  F (36.7  C) Temp src: Oral BP: (!) 163/75 Pulse: 69   Resp: 20 SpO2: 98 % O2 Device: None (Room air) Oxygen Delivery: 2 LPM  Vitals:    09/22/23 1931   Weight: 64.1 kg (141 lb 4.8 oz)     Vital Signs with Ranges  Temp:  [97.8  F (36.6  C)-98  F (36.7  C)] 98  F (36.7  C)  Pulse:  [69-83] 69  Resp:  [18-20] 20  BP: (124-170)/(74-89) 163/75  SpO2:  [97 %-100 %] 98 %  I/O last 3 completed shifts:  In: 140 [P.O.:140]  Out: 2900  [Urine:2900]    @TMAXR(24)@    Patient Vitals for the past 72 hrs:   Weight   09/22/23 1931 64.1 kg (141 lb 4.8 oz)         General - A & O x 3, NAD  HEENT - PERRLA, no scleral icterus  Neck - no carotid bruits, no JVD  Respiratory - Lungs CTA bilat without wheeze, rhonchi, rales  Cardiovascular - AP RRR with murmur  Abdomen - soft, BS present, no bruits, no fluid wave  Extremities - well perfused, no edema  Integumentary - intact, good turgor, no rash/lesions  Neurologic - grossly intact  Psych:  Judgement intact, affect WNL  :  no shoemaker    Laboratory:     Recent Labs   Lab 09/25/23  0620 09/24/23  0541 09/22/23 2145   WBC 5.9 4.2 5.6   RBC 5.69 5.52 5.25   HGB 11.7* 11.7* 11.0*   HCT 38.5* 36.9* 34.8*    195 208         Basic Metabolic Panel:  Recent Labs   Lab 09/25/23  0620 09/24/23  1833 09/24/23  0541 09/23/23  1755 09/23/23  1410 09/23/23  1017 09/23/23  0606 09/23/23  0251 09/22/23 2145   *  127* 127* 125*  125* 120*  --  126* 122*   < > 118*   POTASSIUM 4.0  --  3.6  --   --   --   --   --  3.9   CHLORIDE 88*  --  88*  --   --   --   --   --  82*   CO2 30*  --  29  --   --   --   --   --  25   BUN 9.3  --  8.2  --   --   --   --   --  8.3   CR 0.79  --  0.80  --   --   --   --   --  0.76   *  --  95  --  134*  --   --   --  105*   SHARONDA 8.4*  --  8.5*  --   --   --   --   --  8.8    < > = values in this interval not displayed.         INR  Recent Labs   Lab 09/22/23 2145   INR 1.03         Recent Labs   Lab Test 09/24/23  0541 09/22/23 2145   POTASSIUM 3.6 3.9   CHLORIDE 88* 82*   BUN 8.2 8.3      Recent Labs   Lab Test 09/23/23  0018 09/22/23 2145   ALBUMIN  --  4.3   BILITOTAL  --  0.3   ALT  --  20   AST  --  35   PROTEIN Negative  --        Personally reviewed today's laboratory studies      ~ 50 Minutes today spent in exam, POC, education regarding renal disease and management, counseling and/or discussion with patient's care team.      Marilyn Gates MD  [facility]  [address], [address]  [place] [address]  Phone# [phone]  Fax# [phone]

## 2023-09-26 ENCOUNTER — APPOINTMENT (OUTPATIENT)
Dept: OCCUPATIONAL THERAPY | Facility: HOSPITAL | Age: 68
DRG: 178 | End: 2023-09-26
Payer: COMMERCIAL

## 2023-09-26 VITALS
WEIGHT: 141.3 LBS | RESPIRATION RATE: 18 BRPM | TEMPERATURE: 97.8 F | OXYGEN SATURATION: 99 % | HEART RATE: 97 BPM | SYSTOLIC BLOOD PRESSURE: 174 MMHG | BODY MASS INDEX: 19.14 KG/M2 | DIASTOLIC BLOOD PRESSURE: 82 MMHG | HEIGHT: 72 IN

## 2023-09-26 LAB
ANION GAP SERPL CALCULATED.3IONS-SCNC: 11 MMOL/L (ref 7–15)
BUN SERPL-MCNC: 12.4 MG/DL (ref 8–23)
CALCIUM SERPL-MCNC: 8.9 MG/DL (ref 8.8–10.2)
CHLORIDE SERPL-SCNC: 85 MMOL/L (ref 98–107)
CREAT SERPL-MCNC: 0.71 MG/DL (ref 0.67–1.17)
CRP SERPL-MCNC: 51.5 MG/L
D DIMER PPP FEU-MCNC: 0.38 UG/ML FEU (ref 0–0.5)
DEPRECATED HCO3 PLAS-SCNC: 30 MMOL/L (ref 22–29)
EGFRCR SERPLBLD CKD-EPI 2021: >90 ML/MIN/1.73M2
ERYTHROCYTE [DISTWIDTH] IN BLOOD BY AUTOMATED COUNT: 14.8 % (ref 10–15)
ERYTHROCYTE [SEDIMENTATION RATE] IN BLOOD BY WESTERGREN METHOD: 22 MM/HR (ref 0–20)
GLUCOSE SERPL-MCNC: 99 MG/DL (ref 70–99)
HCT VFR BLD AUTO: 39.9 % (ref 40–53)
HGB BLD-MCNC: 12.4 G/DL (ref 13.3–17.7)
MCH RBC QN AUTO: 20.7 PG (ref 26.5–33)
MCHC RBC AUTO-ENTMCNC: 31.1 G/DL (ref 31.5–36.5)
MCV RBC AUTO: 67 FL (ref 78–100)
PLATELET # BLD AUTO: 234 10E3/UL (ref 150–450)
POTASSIUM SERPL-SCNC: 3.5 MMOL/L (ref 3.4–5.3)
RBC # BLD AUTO: 5.98 10E6/UL (ref 4.4–5.9)
SODIUM SERPL-SCNC: 123 MMOL/L (ref 135–145)
SODIUM SERPL-SCNC: 126 MMOL/L (ref 135–145)
SODIUM SERPL-SCNC: 126 MMOL/L (ref 136–145)
WBC # BLD AUTO: 3.9 10E3/UL (ref 4–11)

## 2023-09-26 PROCEDURE — 97535 SELF CARE MNGMENT TRAINING: CPT | Mod: GO

## 2023-09-26 PROCEDURE — 99239 HOSP IP/OBS DSCHRG MGMT >30: CPT | Performed by: HOSPITALIST

## 2023-09-26 PROCEDURE — 250N000013 HC RX MED GY IP 250 OP 250 PS 637: Performed by: HOSPITALIST

## 2023-09-26 PROCEDURE — 84295 ASSAY OF SERUM SODIUM: CPT | Performed by: INTERNAL MEDICINE

## 2023-09-26 PROCEDURE — 250N000011 HC RX IP 250 OP 636: Mod: JZ | Performed by: HOSPITALIST

## 2023-09-26 PROCEDURE — 36415 COLL VENOUS BLD VENIPUNCTURE: CPT | Performed by: INTERNAL MEDICINE

## 2023-09-26 PROCEDURE — 99232 SBSQ HOSP IP/OBS MODERATE 35: CPT | Performed by: INTERNAL MEDICINE

## 2023-09-26 PROCEDURE — 80048 BASIC METABOLIC PNL TOTAL CA: CPT | Performed by: INTERNAL MEDICINE

## 2023-09-26 PROCEDURE — 85652 RBC SED RATE AUTOMATED: CPT | Performed by: INTERNAL MEDICINE

## 2023-09-26 PROCEDURE — 97110 THERAPEUTIC EXERCISES: CPT | Mod: GO

## 2023-09-26 PROCEDURE — 250N000013 HC RX MED GY IP 250 OP 250 PS 637: Performed by: INTERNAL MEDICINE

## 2023-09-26 PROCEDURE — 84295 ASSAY OF SERUM SODIUM: CPT | Performed by: HOSPITALIST

## 2023-09-26 PROCEDURE — 86140 C-REACTIVE PROTEIN: CPT | Performed by: HOSPITALIST

## 2023-09-26 PROCEDURE — 85379 FIBRIN DEGRADATION QUANT: CPT | Performed by: HOSPITALIST

## 2023-09-26 PROCEDURE — 85027 COMPLETE CBC AUTOMATED: CPT | Performed by: HOSPITALIST

## 2023-09-26 PROCEDURE — 250N000012 HC RX MED GY IP 250 OP 636 PS 637: Performed by: HOSPITALIST

## 2023-09-26 RX ORDER — BISACODYL 10 MG
10 SUPPOSITORY, RECTAL RECTAL DAILY PRN
Status: DISCONTINUED | OUTPATIENT
Start: 2023-09-26 | End: 2023-09-26 | Stop reason: HOSPADM

## 2023-09-26 RX ORDER — AMLODIPINE BESYLATE 5 MG/1
5 TABLET ORAL AT BEDTIME
Qty: 30 TABLET | Refills: 0 | Status: SHIPPED | OUTPATIENT
Start: 2023-09-26

## 2023-09-26 RX ORDER — DOCUSATE SODIUM 100 MG/1
100 CAPSULE, LIQUID FILLED ORAL 2 TIMES DAILY PRN
Qty: 100 CAPSULE | Refills: 0 | Status: SHIPPED | OUTPATIENT
Start: 2023-09-26

## 2023-09-26 RX ADMIN — ENOXAPARIN SODIUM 40 MG: 40 INJECTION SUBCUTANEOUS at 08:19

## 2023-09-26 RX ADMIN — Medication 15 G: at 21:47

## 2023-09-26 RX ADMIN — AMLODIPINE BESYLATE 5 MG: 5 TABLET ORAL at 21:47

## 2023-09-26 RX ADMIN — PREDNISONE 1.5 MG: 1 TABLET ORAL at 08:19

## 2023-09-26 RX ADMIN — Medication 15 G: at 10:28

## 2023-09-26 RX ADMIN — LEVOTHYROXINE SODIUM 75 MCG: 0.03 TABLET ORAL at 07:08

## 2023-09-26 RX ADMIN — Medication 1 TABLET: at 08:19

## 2023-09-26 ASSESSMENT — ACTIVITIES OF DAILY LIVING (ADL)
ADLS_ACUITY_SCORE: 24
ADLS_ACUITY_SCORE: 28
ADLS_ACUITY_SCORE: 27
ADLS_ACUITY_SCORE: 24
ADLS_ACUITY_SCORE: 24
ADLS_ACUITY_SCORE: 28
ADLS_ACUITY_SCORE: 24
ADLS_ACUITY_SCORE: 24
ADLS_ACUITY_SCORE: 28
ADLS_ACUITY_SCORE: 24
ADLS_ACUITY_SCORE: 25

## 2023-09-26 NOTE — PLAN OF CARE
Problem: Gas Exchange Impaired  Goal: Optimal Gas Exchange  Outcome: Progressing  Intervention: Optimize Oxygenation and Ventilation  Recent Flowsheet Documentation  Taken 9/26/2023 0145 by Kristin Stuart, RN  Head of Bed (HOB) Positioning: HOB at 20-30 degrees     Problem: Plan of Care - These are the overarching goals to be used throughout the patient stay.    Goal: Optimal Comfort and Wellbeing  Intervention: Monitor Pain and Promote Comfort  Recent Flowsheet Documentation  Taken 9/26/2023 0145 by Kristin Stuart, RN  Pain Management Interventions:   declines   emotional support   environmental changes   pillow support provided   therapeutic presence   Goal Outcome Evaluation:    Patient alert, oriented x 4. Complained of sore back from laying down. Encouraged to reposition self. Pleasant and co-operative with cares. Saturation 98 % RA, denied SOB. Will continue to monitor the patient.

## 2023-09-26 NOTE — PROGRESS NOTES
Pt recheck sodium 123. Spoke to Dr. Clemente, sodium will be rechecked at 2000, if 126 or above, pt can go home tonight.  Discussed with pt.

## 2023-09-26 NOTE — PLAN OF CARE
Occupational Therapy Discharge Summary    Reason for therapy discharge:    All goals and outcomes met, no further needs identified.    Progress towards therapy goal(s). See goals on Care Plan in [facility] electronic health record for goal details.  Goals partially met-declining need    Therapy recommendation(s):    No further therapy is recommended.

## 2023-09-26 NOTE — PROGRESS NOTES
09/25/23 1300   Appointment Info   Signing Clinician's Name / Credentials (OT) Felicia Weir OTR/L   Living Environment   People in Home spouse   Current Living Arrangements house   Living Environment Comments 5-12 steps to enter after main 4 steps   Self-Care   Usual Activity Tolerance excellent   Regular Exercise Yes   Activity/Exercise Type walking;strength training;other (see comments)  (el)   Exercise Amount/Frequency 3-5 times/wk   Equipment Currently Used at Home shower chair   Fall history within last six months no   Activity/Exercise/Self-Care Comment independent self cares   Instrumental Activities of Daily Living (IADL)   Previous Responsibilities driving;yardwork  (2 acres)   IADL Comments wife cooks, pt. writes   General Information   Onset of Illness/Injury or Date of Surgery 09/22/23   Referring Physician Kayla Hope MD   Existing Precautions/Restrictions other (see comments)  (COVID)   Cognitive Status Examination   Orientation Status orientation to person, place and time   Cognitive Status Comments states he has noticed some difficulty with word finding and ST memory deficits noted.   Visual Perception   Visual Impairment/Limitations corrective lenses full-time   Pain Assessment   Patient Currently in Pain No   Range of Motion Comprehensive   General Range of Motion no range of motion deficits identified   Bed Mobility   Bed Mobility No deficits identified   Transfers   Transfers sit-stand transfer   Sit-Stand Transfer   Sit-Stand Imperial (Transfers) contact guard   Sit/Stand Transfer Comments episode of vagal response/worried about passing out   Balance   Balance Assessment standing balance: static;standing balance: dynamic   Balance Comments CGA/SBA with min. falter in balance, wants to use furniture for support   Activities of Daily Living   BADL Assessment/Intervention bathing   Bathing Assessment/Intervention   Imperial Level (Bathing) not tested  (declined due to fatique)    Clinical Impression   Criteria for Skilled Therapeutic Interventions Met (OT) Yes, treatment indicated   OT Diagnosis decreased ADL   Influenced by the following impairments fatique   OT Problem List-Impairments impacting ADL activity tolerance impaired;balance;strength;mobility   Assessment of Occupational Performance 1-3 Performance Deficits   Identified Performance Deficits bathing, functional mobility,   Planned Therapy Interventions (OT) ADL retraining;balance training;strengthening;transfer training;progressive activity/exercise   Clinical Decision Making Complexity (OT) low complexity   Anticipated Equipment Needs Upon Discharge (OT) shower chair   Risk & Benefits of therapy have been explained evaluation/treatment results reviewed;patient   OT Total Evaluation Time   OT Eval, Low Complexity Minutes (12708) 30   OT Goals   Therapy Frequency (OT) Daily   OT Predicted Duration/Target Date for Goal Attainment 09/29/23   OT Goals Aerobic Activity;Lower Body Dressing;Cognition   OT: Upper Body Bathing Supervision/stand-by assist   OT: Lower Body Bathing Supervision/stand-by assist   OT: Cognitive Patient/caregiver will verbalize understanding of cognitive assessment results/recommendations as needed for safe discharge planning   OT: Perform aerobic activity with stable cardiovascular response continuous activity;10 minutes  (ADL's standing, U/E exercises standing)   Self-Care/Home Management   Self-Care/Home Mgmt/ADL, Compensatory, Meal Prep Minutes (04103) 8   Symptoms Noted During/After Treatment (Meal Preparation/Planning Training) fatigue   Treatment Detail/Skilled Intervention Pt. participated in functional endurance ambulation in room with SBA/CGA for safet,y educating in pacing self at home and safety needs-shower chair-oxygen sats 100-95%, HR 74-82.   Therapeutic Procedures/Exercise   Therapeutic Procedure: strength, endurance, ROM, flexibillity minutes (28386) 10   Symptoms Noted During/After Treatment  fatigue   Treatment Detail/Skilled Intervention B U/E therabar exercises x 6, 15-20 reps, 3/6 standing with rest periods   OT Discharge Planning   OT Plan bathing, standing ADL, exercises   OT Discharge Recommendation (DC Rec) (S)  home with assist   OT Rationale for DC Rec Moving with SBA/CGA, supportive wife who is also on the mend from COVID   OT Brief overview of current status SBA/CGA with functional mobility in room, no device, exercises, gaining confidence for return home with wife   Total Session Time   Timed Code Treatment Minutes 18   Total Session Time (sum of timed and untimed services) 48

## 2023-09-26 NOTE — PROGRESS NOTES
RENAL CONSULT NOTE      ASSESSMENT/PLAN:  This is a 68 year old gentleman with a past medical history significant for HLD, hypothyroidism, giant cell arteritis, ankylosing spondylitis,  alpha thalassemia and chronic hyponatremia.  He presented to [facility] ED late on 9/22 for fatigue and cough.  Found to be febrile to 100.2.  Testing notable for positive COVID test as well as acute hyponatremia of 118. Admitted early yesterday for further treatment.  Nephrology is following for management of hyponatremia.      Hyponatremia - chronic tendency to hyponatremia, not on any particular medications that should cause this.  Urine osm was high for a sodium of 133 in 11/2022 at [facility].  However here his urine osm was only 220.  He pretty clearly doesn't have a fixed urine osm and can dilute his urine to some degree. His acute hyponatremia is likely 2/2 some degree of increased water intake prior to admission plus excess ADH expression from COVID  Patient had a borderline low cortisol level with hypertension.   TSH was wnl in 11/2022 at [facility].  Patient serum sodium was 136 in March 2023.  Patient presents serum sodium of 118.  On 09/25 serum sodium was trending up from 125 to 127. The rate of correction is appropriate.   Serum sodium dropped to 126 in settings of severe low back pain.  Hypoosmolar, serum Osm 256.  Recs:  -Started on urea powder 15 g twice daily  -continue 1200 ml fluid restriction   - not ideal to use salt tabs in the longer term and  may not need on discharge.  -I also recommend to increase his protein intake to add protein bars as snacks  -Repeat serum sodium this afternoon.  Okay to discharge from renal stand point if serum sodium is stable at 126 or greater.  Continue urea powder 15 g daily on discharge for neck 7 days.  Patient should have repeat serum sodium on September 28 at [facility] in [place].  -Our clinic will arrange follow-up visit in 2 weeks.    COVID-19 - being treated with  Paxlovid and enoxaparin.  Defer further therapies to [facility].  Some hypoxia while asleep    Elevated BP -patient blood pressure is much better controlled this morning . On 09/25 amlodipine 5 mg daily.  Recommend to continue on discharge.    GCA - on very low dose prednisone  1.5 mg daily, I discussed that his elevated CRP seems more likely related to COVID than a GCA flare    Nocturia - prior negative cystoscopy with Urology, presumed BPH, recommended double voiding    History of chronic intermittent hematuria - worked up by Urology per patient, negative cystoscopy.  His kidney function is normal.  No proteinuria.  Pending negative extensive work-up for GM at [facility].  Patient might have a mild form of IgA nephropathy.    Hypothyroidism - on replacement    Discussed with .      We will follow      Subjective:  Patient was seen at the bedside and events reviewed with nursing.  No acute issues overnight. Patient states that he is feeling much better.  Patient is complaining of severe pain in lower back attributing to flare of ankylosing spondylitis.  Patient stated he does not take any medications for the pain control.  Usually pain is manageable with stretching exercises.  Patient is asking if ESR can be added to his morning labs because he is overdue.  Tolerated to urea powder this morning.  Patient denies: fever, chills, cough, shortness of breath , chest pain, palpitations, orthopnea, nausea, vomiting, abdominal pain, changes in bowel habits, dysuria, urinary frequency, urgency, hematuria, rash.  Updated on labs. All questions answered.      PHYSICAL EXAM:  Physical Exam   Temp: 97.8  F (36.6  C) Temp src: Oral BP: 132/74 Pulse: 91   Resp: 20 SpO2: 97 % O2 Device: None (Room air)    Vitals:    09/22/23 1931   Weight: 64.1 kg (141 lb 4.8 oz)     Vital Signs with Ranges  Temp:  [97.8  F (36.6  C)-98.2  F (36.8  C)] 97.8  F (36.6  C)  Pulse:  [72-91] 91  Resp:  [20] 20  BP: (132-170)/(74-90) 132/74  SpO2:   [97 %-98 %] 97 %  I/O last 3 completed shifts:  In: 480 [P.O.:480]  Out: 825 [Urine:825]    @TMAXR(24)@    No data found.      General - A & O x 3, NAD  HEENT - PERRLA, no scleral icterus  Neck - no carotid bruits, no JVD  Respiratory - Lungs CTA bilat without wheeze, rhonchi, rales  Cardiovascular - AP RRR with murmur  Abdomen - soft, BS present, no bruits, no fluid wave  Extremities - well perfused, no edema  Integumentary - intact, good turgor, no rash/lesions  Neurologic - grossly intact  Psych:  Judgement intact, affect WNL  :  no shoemaker    Laboratory:     Recent Labs   Lab 09/26/23  0510 09/25/23  0620 09/24/23  0541 09/22/23  2145   WBC 3.9* 5.9 4.2 5.6   RBC 5.98* 5.69 5.52 5.25   HGB 12.4* 11.7* 11.7* 11.0*   HCT 39.9* 38.5* 36.9* 34.8*    210 195 208         Basic Metabolic Panel:  Recent Labs   Lab 09/26/23  0510 09/25/23  0620 09/24/23  1833 09/24/23  0541 09/23/23  1755 09/23/23  1410 09/23/23  1017 09/23/23  0251 09/22/23  2145   * 127*  127* 127* 125*  125* 120*  --  126*   < > 118*   POTASSIUM 3.5 4.0  --  3.6  --   --   --   --  3.9   CHLORIDE 85* 88*  --  88*  --   --   --   --  82*   CO2 30* 30*  --  29  --   --   --   --  25   BUN 12.4 9.3  --  8.2  --   --   --   --  8.3   CR 0.71 0.79  --  0.80  --   --   --   --  0.76   GLC 99 102*  --  95  --  134*  --   --  105*   SHARONDA 8.9 8.4*  --  8.5*  --   --   --   --  8.8    < > = values in this interval not displayed.         INR  Recent Labs   Lab 09/22/23 2145   INR 1.03         Recent Labs   Lab Test 09/24/23  0541 09/22/23 2145   POTASSIUM 3.6 3.9   CHLORIDE 88* 82*   BUN 8.2 8.3      Recent Labs   Lab Test 09/23/23  0018 09/22/23 2145   ALBUMIN  --  4.3   BILITOTAL  --  0.3   ALT  --  20   AST  --  35   PROTEIN Negative  --        Personally reviewed today's laboratory studies      ~ 50 Minutes today spent in exam, POC, education regarding renal disease and management, counseling and/or discussion with patient's care  team.      Marilyn Gates MD  [facility]  [address], [address]  [place] [address]  Phone# [phone]  Fax# [phone]

## 2023-09-26 NOTE — PLAN OF CARE
"  Problem: Plan of Care - These are the overarching goals to be used throughout the patient stay.    Goal: Plan of Care Review  Description: The Plan of Care Review/Shift note should be completed every shift.  The Outcome Evaluation is a brief statement about your assessment that the patient is improving, declining, or no change.  This information will be displayed automatically on your shift note.  Outcome: Progressing  Flowsheets (Taken 9/25/2023 2055)  Plan of Care Reviewed With: patient  Goal: Patient-Specific Goal (Individualized)  Description: You can add care plan individualizations to a care plan. Examples of Individualization might be:  \"Parent requests to be called daily at 9am for status\", \"I have a hard time hearing out of my right ear\", or \"Do not touch me to wake me up as it startles me\".  Outcome: Progressing  Goal: Absence of Hospital-Acquired Illness or Injury  Outcome: Progressing  Intervention: Identify and Manage Fall Risk  Recent Flowsheet Documentation  Taken 9/25/2023 1649 by Alvin Brooks RN  Safety Promotion/Fall Prevention:   activity supervised   clutter free environment maintained  Intervention: Prevent Skin Injury  Recent Flowsheet Documentation  Taken 9/25/2023 1649 by Alvin Brooks, RN  Body Position: position changed independently  Goal: Readiness for Transition of Care  Outcome: Progressing     Problem: Gas Exchange Impaired  Goal: Optimal Gas Exchange  Outcome: Progressing  Intervention: Optimize Oxygenation and Ventilation  Recent Flowsheet Documentation  Taken 9/25/2023 1649 by Alvin Brooks RN  Head of Bed (HOB) Positioning: HOB at 20-30 degrees   Goal Outcome Evaluation:      Plan of Care Reviewed With: patient                 "

## 2023-09-26 NOTE — PROGRESS NOTES
[facility]    Medicine Progress Note - Hospitalist Service    Date of Admission:  9/22/2023    Assessment & Plan                Gary Sutherland is a 68 year old male with history of giant cell arteritis, ankylosing spondylitis, SIADH, hypothyroidism came back from [place] cruise trip and came to the ED for evaluation of fever, cough, fatigue x5 days found to have COVID19. Hospital Day: 5        # Confirmed COVID-19 infection     Symptom Onset 9/18/2023   Date of 1st Positive Test 9/22/2023   Vaccination Status Fully Vaccinated         - COVID-19 special precautions  - had minimal hypoxia. On room air  - started on paxlovid here  - DVT Prophylaxis: Lovenox 40 mg daily     #Hyponatremia  -History of SIADH PTA on fluid restriction.  Sodium 118 on admission.  Started on sodium tablets 1 g 3 times daily on 9/23.  Sodium since then fluctuating in mid 120s.  -Urine osmolality 220, indicating he can dilute urine somewhat.  -Acute on chronic hyponatremia likely due to ADH release from COVID.  -TSH 2.07, euthyroid, not the culprit  -Checked cosyntropin test, peak cortisol level adequate at 18.7, c/w home dose of prednisone for now.  -Nephrology following  -Continue fluid restriction 1200mL.  -9/25 salt tablets discontinued, per nephrology recommendations. Today started on urea 15g BID.  Sodium downtrending today 128-->126-->123 at 1pm. Discussed with nephrology, plan had been to discharge if the 1pm check was stable. Since it was worse, we are checking again at 8pm, if 126 or higher, he can discharge in my opinion. The 123 value may have been a spurious drop. He will follow up soon for lab recheck with his primary clinic.     #Giant cell arteritis  -History of temporal arteritis, Raynaud's and ankylosing spondylitis  -On prednisone since 2015, very low-dose of 1.5 mg, and symptom stable.  -patient requested ESR be checked but it will be high right now from COVID, I do not feel this is useful. Would  wait minimum 2 weeks after COVID if wanting to monitor GCA control.     #Hypoxia  -Only noted when patient is asleep  -Chest x-ray showed hyperinflation versus deep inspiration changes, clinical correlation for air trapping, no focal lung infiltrates  -Supplemental O2 to keep SPO2 above 92%  -hypertensive during hospitalization, with episode/hypotension  -Patient reports snoring, recommended outpatient sleep study     #Hypothyroidism  -Continue PTA levothyroxine, TSH at goal     #Hyperlipidemia  -Hold atorvastatin while on Paxlovid     #Elevated blood pressure without hypertension  -Not on PTA medications for blood pressure control although hypertension is listed on problem list  -Hypertensive during hospitalization with episodes of orthostatic hypotension 9/24 with episode of near syncope. 9/25 negative orthostatic hypotension.  BP persistently elevated.  - 9/25 started on low-dose 5 mg amlodipine at bedtime. Tolerating well  -IV hydralazine as needed     #Microcytic anemia  -History of alpha thalassemia trait  -Monitor for bleeding  -Outpatient work-up    #Borderline underweight  -BMI 19.1 noted    #Constipation  -Had BM today  -Colace Rx at discharge       DVT Prophylaxis: Moderate risk. Lovenox  Diet: Combination Diet Regular Diet Adult  Fluid restriction 1200 ML FLUID    Early Catheter: Not present  Lines: None     Cardiac Monitoring: None  Code Status: Full Code      Clinically Significant Risk Factors         # Hyponatremia: Lowest Na = 123 mmol/L in last 2 days, will monitor as appropriate          # Hypertension: Noted on problem list                   Disposition Plan   Disposition: Home     Expected Discharge Date: 09/27/2023    Discharge Delays: Lab Result Pending (enter specific test & time in comments)  Destination: home with family       Medically ready to discharge today: Yes if 8pm sodium is 126 or higher     The patient's care was discussed with the Patient and nephrology Consultant(s).    Rosibel  MD Chyna  Hospitalist Service  [facility]  Securely message with [employer] (more info)  Text page via [employer] Paging/Directory   ______________________________________________________________________      Physical Exam   Vital Signs: Temp: 97.8  F (36.6  C) Temp src: Oral BP: (!) 174/82 Pulse: 97   Resp: 18 SpO2: 99 % O2 Device: None (Room air)    Weight: 141 lbs 4.8 oz  General: in no apparent distress, non-toxic, and alert somewhat cachectic male sitting in bedside chair oriented x3  HEENT: Head normocephalic atraumatic, oral mucosa moist. Sclerae anicteric  CV: Regular rhythm, normal rate, no murmurs  Resp: No wheezes, no rales or rhonchi, no focal consolidations  GI: Belly soft, nondistended, nontender, bowel sounds present  Skin: No rashes or lesions  Extremities: No peripheral edema  Psych: Normal affect, mood euthymic  Neuro: Grossly normal        Medical Decision Making               Data   Recent Results (from the past 12 hour(s))   Sodium    Collection Time: 09/26/23  1:06 PM   Result Value Ref Range    Sodium 123 (L) 135 - 145 mmol/L     Interval History   2:09 PM    Notified of sodium recheck 123.  Ordered another sodium check for 8pm. Ok from my perspective to discharge if 126 or higher. 123 may have been spurious drop. If lower than 126, should stay overnight.  Discharge orders in place and will ask colleague to follow up on sodium recheck.    9:21 AM    Patient states doing very well today. He feels he should be discharged today. He does not feel he is benefiting from ongoing hospital stay. I think this is probably ok, with close follow up and if ok by nephrology. Patient is nervous to be taken off of salt tabs and would prefer to continue for a week or so. He would like Rx for colace at discharge. Discussed quarantine. Potential discharge later today.

## 2023-09-27 LAB — BACTERIA BLD CULT: NO GROWTH

## 2023-09-27 NOTE — PLAN OF CARE
Physical Therapy Discharge Summary    Reason for therapy discharge:    Discharged to home.    Progress towards therapy goal(s). See goals on Care Plan in [facility] electronic health record for goal details.  Goals partially met.  Barriers to achieving goals:   discharge from facility.    Therapy recommendation(s):    No further therapy is recommended.

## 2023-09-27 NOTE — DISCHARGE SUMMARY
[facility] MEDICINE  DISCHARGE SUMMARY     Primary Care Physician: Dc Pierre  Admission Date: 9/22/2023   Discharge Provider: Rosibel Clemente MD Discharge Date: 9/26/2023   Diet:   Active Diet and Nourishment Order   Procedures     Diet       Code Status: full   Activity: DCACTIVITY: Activity as tolerated        Condition at Discharge: Good     REASON FOR PRESENTATION(See Admission Note for Details)   Fever, cough, fatigue    PRINCIPAL & ACTIVE DISCHARGE DIAGNOSES     Principal Problem:    Hyponatremia  Active Problems:    SIADH (syndrome of inappropriate ADH production) (H)    Temporal arteritis (H)    Giant cell arteritis syndrome (H)    COVID-19    Hypothyroidism    Orthostatic hypotension      PENDING LABS     Unresulted Labs Ordered in the Past 30 Days of this Admission       Date and Time Order Name Status Description    9/22/2023  9:26 PM Blood Culture Line, venous Preliminary     9/22/2023  9:26 PM Blood Culture Line, venous Preliminary             PROCEDURES ( this hospitalization only)      none    RECOMMENDATIONS TO OUTPATIENT PROVIDER FOR F/U VISIT     Follow-up Appointments     Follow-up and recommended labs and tests       Follow up with primary care provider, Dc Pierre, within 7 days for   hospital follow- up.  The following labs/tests are recommended: blood   pressure check and basic metabolic profile in 2 days or so. They could   check your ESR at that time as well.  Follow up with nephrology in 2-4 weeks when able to get appt. You should   hear from them to schedule. You can call [facility] at [phone] to schedule if you do not hear from them.            DISPOSITION     Home    SUMMARY OF HOSPITAL COURSE:      Gary Sutherland is a 68 year old male with history of giant cell arteritis, ankylosing spondylitis, SIADH, hypothyroidism came back from [place] cruise trip and came to the ED for evaluation of fever, cough, fatigue x5 days found  to have COVID19 and severe hyponatremia. Hospital Day: 5        # Confirmed COVID-19 infection     Symptom Onset 9/18/2023   Date of 1st Positive Test 9/22/2023   Vaccination Status Fully Vaccinated         - COVID-19 special precautions  - had minimal hypoxia. On room air  - counseled on quarantine duration  - started on paxlovid here, was supposed to receive the remainder of dosepak at discharge but evidently did not, he tells me they got it sorted out day after discharge but only after a lot of phone calls     #Hyponatremia  -History of SIADH PTA on fluid restriction.  Sodium 118 on admission.  Started on sodium tablets 1 g 3 times daily on 9/23.  Sodium since then fluctuating in mid 120s.  -Urine osmolality 220, indicating he can dilute urine somewhat.  -Acute on chronic hyponatremia likely due to ADH release from COVID.  -TSH 2.07, euthyroid, not the culprit  -Checked cosyntropin test, peak cortisol level adequate at 18.7, c/w home dose of prednisone for now.  -Nephrology evaluated here  -9/25 salt tablets discontinued, per nephrology recommendations. Today started on urea 15g BID.  Sodium overall stable today 128-->126-->123-->126. He will follow up soon for lab recheck with his primary clinic.     #Giant cell arteritis  -History of temporal arteritis, Raynaud's and ankylosing spondylitis  -On prednisone since 2015, very low-dose of 1.5 mg, and symptom stable.  -patient requested ESR be checked but it will be high right now from COVID, I do not feel this is useful. Would wait minimum 2 weeks after COVID if wanting to monitor GCA control.     #Hypoxia  -Only noted when patient is asleep  -Chest x-ray showed hyperinflation versus deep inspiration changes, clinical correlation for air trapping, no focal lung infiltrates  -Patient reports snoring, recommended outpatient sleep study     #Hyperlipidemia  -Hold atorvastatin while on Paxlovid     #Elevated blood pressure without hypertension  -Not on PTA medications  for blood pressure control although hypertension is listed on problem list  - 9/25 started on low-dose 5 mg amlodipine at bedtime. Tolerating well  -BP check as outpatient     #Constipation  -Had BM today  -Colace Rx at discharge      Discharge Medications with Med changes:     Discharge Medication List as of 9/26/2023  9:39 PM        START taking these medications    Details   amLODIPine (NORVASC) 5 MG tablet Take 1 tablet (5 mg) by mouth At Bedtime, Disp-30 tablet, R-0, E-Prescribe      docusate sodium (COLACE) 100 MG capsule Take 1 capsule (100 mg) by mouth 2 times daily as needed for constipation, Disp-100 capsule, R-0, E-Prescribe      nirmatrelvir and ritonavir (PAXLOVID) 300 mg/100 mg therapy pack Take 3 tablets by mouth 2 times daily for 3 doses . Finish remainder of Paxlovid therapy pack that was started in the hospital.  Follow instructions on that pack., R-0, No Print OutThis order will not be sent to a retail pharmacy. This order is intended  for the AVS summary and for continuation of the remainder of the Paxlovid pack dispensed by the inpatient pharmacy at home. Confirm that the patient has received the inpatient pharmacy supplied Paxlovid to take home.      Urea (URE-NA) 15 g PACK packet Take 15 g by mouth daily for 7 days, Disp-7 packet, R-0, E-Prescribe           CONTINUE these medications which have NOT CHANGED    Details   atorvastatin (LIPITOR) 10 MG tablet [ATORVASTATIN (LIPITOR) 10 MG TABLET] Take 10 mg by mouth daily with lunch. , Historical      cholecalciferol, vitamin D3, 1,000 unit tablet Take 1,000 Units by mouth every other day, Historical      levothyroxine (SYNTHROID, LEVOTHROID) 75 MCG tablet [LEVOTHYROXINE (SYNTHROID, LEVOTHROID) 75 MCG TABLET] Take 75 mcg by mouth Daily at 6:00 am. , Historical      Multiple Vitamins-Minerals (MULTIVITAMIN MEN 50+ PO) Take 1 tablet by mouth daily, Historical      predniSONE (DELTASONE) 1 MG tablet Take 1.5 mg by mouth daily, Historical                Consults     CARE MANAGEMENT / SOCIAL WORK IP CONSULT  NEPHROLOGY IP CONSULT  PHYSICAL THERAPY ADULT IP CONSULT  OCCUPATIONAL THERAPY ADULT IP CONSULT      SIGNIFICANT IMAGING FINDINGS     Results for orders placed or performed during the hospital encounter of 09/22/23   Chest XR,  PA & LAT    Impression    IMPRESSION:   Hyperinflation versus deep inspiration changes. Clinical correlation for air trapping. Heart size and pulmonary vascularity within normal limits. No focal lung infiltrates. Pectus excavatum. Osseous structures grossly intact. Visualized   upper abdomen unremarkable.       SIGNIFICANT LABORATORY FINDINGS     Most Recent 3 BMP's:  Recent Labs   Lab Test 09/26/23 2012 09/26/23  1306 09/26/23  0510 09/25/23  0620 09/24/23  1833 09/24/23  0541   * 123* 126* 127*  127*   < > 125*  125*   POTASSIUM  --   --  3.5 4.0  --  3.6   CHLORIDE  --   --  85* 88*  --  88*   CO2  --   --  30* 30*  --  29   BUN  --   --  12.4 9.3  --  8.2   CR  --   --  0.71 0.79  --  0.80   ANIONGAP  --   --  11 9  --  8   SHARONDA  --   --  8.9 8.4*  --  8.5*   GLC  --   --  99 102*  --  95    < > = values in this interval not displayed.       Discharge Orders        Reason for your hospital stay    COVID-19, low sodium     Follow-up and recommended labs and tests     Follow up with primary care provider, Dc Pierre, within 7 days for hospital follow- up.  The following labs/tests are recommended: blood pressure check and basic metabolic profile in 2 days or so. They could check your ESR at that time as well.  Follow up with nephrology in 2-4 weeks when able to get appt. You should hear from them to schedule. You can call [facility] at [phone] to schedule if you do not hear from them.     Activity    Your activity upon discharge: activity as tolerated     When to contact your care team    Call your primary doctor if you have any of the following: chest pain, shortness of breath, fever, chills,  fainting, dizziness, vomiting, constipation, dehydration, worsening pain, bleeding, or trouble urinating, or any other symptoms that are new or concerning to you.     Discharge Instructions    If the urea is too expensive, it is ok not to take this medication, per nephrology. Try to get plenty of protein in your diet and you should be ok.     Diet    Follow this diet upon discharge: resume your regular diet. Try to eat more protein.       Examination   Physical Exam      Wt Readings from Last 1 Encounters:   09/22/23 64.1 kg (141 lb 4.8 oz)       Please see EMR for more detailed significant labs, imaging, consultant notes etc.    IRosibel MD, personally saw the patient today and spent greater than 30 minutes discharging this patient.    Rosibel Clemente MD  [facility]    CC:Dc Pierre

## 2023-09-27 NOTE — PLAN OF CARE
"  Problem: Plan of Care - These are the overarching goals to be used throughout the patient stay.    Goal: Plan of Care Review  Description: The Plan of Care Review/Shift note should be completed every shift.  The Outcome Evaluation is a brief statement about your assessment that the patient is improving, declining, or no change.  This information will be displayed automatically on your shift note.  Outcome: Met  Flowsheets (Taken 9/26/2023 1908)  Plan of Care Reviewed With: patient  Goal: Patient-Specific Goal (Individualized)  Description: You can add care plan individualizations to a care plan. Examples of Individualization might be:  \"Parent requests to be called daily at 9am for status\", \"I have a hard time hearing out of my right ear\", or \"Do not touch me to wake me up as it startles me\".  Outcome: Met  Goal: Absence of Hospital-Acquired Illness or Injury  Outcome: Met  Intervention: Identify and Manage Fall Risk  Recent Flowsheet Documentation  Taken 9/26/2023 1700 by Alvin Brooks RN  Safety Promotion/Fall Prevention: activity supervised  Intervention: Prevent Skin Injury  Recent Flowsheet Documentation  Taken 9/26/2023 1700 by Alvin Brooks RN  Body Position: position changed independently  Goal: Readiness for Transition of Care  Outcome: Met     Problem: Gas Exchange Impaired  Goal: Optimal Gas Exchange  Outcome: Met  Intervention: Optimize Oxygenation and Ventilation  Recent Flowsheet Documentation  Taken 9/26/2023 1700 by Alvin Brooks, RN  Head of Bed (HOB) Positioning: HOB at 20-30 degrees   Goal Outcome Evaluation:      Plan of Care Reviewed With: patient                 "

## 2023-09-27 NOTE — SIGNIFICANT EVENT
Significant Event Note    Time of event: 9:17 PM September 26, 2023    Description of event:  Repeat serum sodium level of 126.    Plan:  As per AM rounder, discharge pt today if sodium level 126 or higher    Discussed with: bedside nurse    Lenard Rojas MD

## 2023-09-28 LAB — BACTERIA SPEC CULT: NO GROWTH

## 2023-10-01 LAB
ATRIAL RATE - MUSE: 69 BPM
DIASTOLIC BLOOD PRESSURE - MUSE: 67 MMHG
INTERPRETATION ECG - MUSE: NORMAL
P AXIS - MUSE: 85 DEGREES
PR INTERVAL - MUSE: 150 MS
QRS DURATION - MUSE: 86 MS
QT - MUSE: 396 MS
QTC - MUSE: 424 MS
R AXIS - MUSE: 88 DEGREES
SYSTOLIC BLOOD PRESSURE - MUSE: 127 MMHG
T AXIS - MUSE: 87 DEGREES
VENTRICULAR RATE- MUSE: 69 BPM

## 2024-10-19 ENCOUNTER — HEALTH MAINTENANCE LETTER (OUTPATIENT)
Age: 69
End: 2024-10-19

## 2025-03-05 ENCOUNTER — HOSPITAL ENCOUNTER (OUTPATIENT)
Facility: AMBULATORY SURGERY CENTER | Age: 70
End: 2025-03-05
Attending: COLON & RECTAL SURGERY
Payer: COMMERCIAL

## 2025-03-31 ENCOUNTER — ANESTHESIA EVENT (OUTPATIENT)
Dept: SURGERY | Facility: AMBULATORY SURGERY CENTER | Age: 70
End: 2025-03-31

## 2025-03-31 RX ORDER — SODIUM CHLORIDE, SODIUM LACTATE, POTASSIUM CHLORIDE, CALCIUM CHLORIDE 600; 310; 30; 20 MG/100ML; MG/100ML; MG/100ML; MG/100ML
INJECTION, SOLUTION INTRAVENOUS CONTINUOUS
Status: CANCELLED | OUTPATIENT
Start: 2025-03-31

## 2025-03-31 RX ORDER — LIDOCAINE 40 MG/G
CREAM TOPICAL
Status: CANCELLED | OUTPATIENT
Start: 2025-03-31

## 2025-04-01 ENCOUNTER — ANESTHESIA (OUTPATIENT)
Dept: SURGERY | Facility: AMBULATORY SURGERY CENTER | Age: 70
End: 2025-04-01

## 2025-04-12 ENCOUNTER — HOSPITAL ENCOUNTER (EMERGENCY)
Facility: HOSPITAL | Age: 70
Discharge: HOME | End: 2025-04-12
Admitting: EMERGENCY MEDICINE
Payer: COMMERCIAL

## 2025-04-12 VITALS
DIASTOLIC BLOOD PRESSURE: 75 MMHG | HEART RATE: 72 BPM | WEIGHT: 145.1 LBS | SYSTOLIC BLOOD PRESSURE: 158 MMHG | TEMPERATURE: 98.2 F | HEIGHT: 72 IN | BODY MASS INDEX: 19.65 KG/M2 | RESPIRATION RATE: 16 BRPM | OXYGEN SATURATION: 100 %

## 2025-04-12 DIAGNOSIS — I49.1 PAC (PREMATURE ATRIAL CONTRACTION): ICD-10-CM

## 2025-04-12 LAB
ALBUMIN SERPL BCG-MCNC: 4.2 G/DL (ref 3.5–5.2)
ALP SERPL-CCNC: 51 U/L (ref 40–150)
ALT SERPL W P-5'-P-CCNC: 16 U/L (ref 0–70)
ANION GAP SERPL CALCULATED.3IONS-SCNC: 10 MMOL/L (ref 7–15)
AST SERPL W P-5'-P-CCNC: 31 U/L (ref 0–45)
BASOPHILS # BLD AUTO: 0 10E3/UL (ref 0–0.2)
BASOPHILS NFR BLD AUTO: 1 %
BILIRUB SERPL-MCNC: 0.3 MG/DL
BUN SERPL-MCNC: 16.7 MG/DL (ref 8–23)
CALCIUM SERPL-MCNC: 9.3 MG/DL (ref 8.8–10.4)
CHLORIDE SERPL-SCNC: 94 MMOL/L (ref 98–107)
CREAT SERPL-MCNC: 0.89 MG/DL (ref 0.67–1.17)
EGFRCR SERPLBLD CKD-EPI 2021: >90 ML/MIN/1.73M2
EOSINOPHIL # BLD AUTO: 0 10E3/UL (ref 0–0.7)
EOSINOPHIL NFR BLD AUTO: 1 %
ERYTHROCYTE [DISTWIDTH] IN BLOOD BY AUTOMATED COUNT: 14.9 % (ref 10–15)
GLUCOSE SERPL-MCNC: 103 MG/DL (ref 70–99)
HCO3 SERPL-SCNC: 28 MMOL/L (ref 22–29)
HCT VFR BLD AUTO: 37.2 % (ref 40–53)
HGB BLD-MCNC: 11.2 G/DL (ref 13.3–17.7)
IMM GRANULOCYTES # BLD: 0 10E3/UL
IMM GRANULOCYTES NFR BLD: 0 %
LIPASE SERPL-CCNC: 59 U/L (ref 13–60)
LYMPHOCYTES # BLD AUTO: 0.7 10E3/UL (ref 0.8–5.3)
LYMPHOCYTES NFR BLD AUTO: 13 %
MAGNESIUM SERPL-MCNC: 2 MG/DL (ref 1.7–2.3)
MCH RBC QN AUTO: 20.6 PG (ref 26.5–33)
MCHC RBC AUTO-ENTMCNC: 30.1 G/DL (ref 31.5–36.5)
MCV RBC AUTO: 68 FL (ref 78–100)
MONOCYTES # BLD AUTO: 0.7 10E3/UL (ref 0–1.3)
MONOCYTES NFR BLD AUTO: 13 %
NEUTROPHILS # BLD AUTO: 4 10E3/UL (ref 1.6–8.3)
NEUTROPHILS NFR BLD AUTO: 72 %
NRBC # BLD AUTO: 0 10E3/UL
NRBC BLD AUTO-RTO: 0 /100
PLATELET # BLD AUTO: 240 10E3/UL (ref 150–450)
POTASSIUM SERPL-SCNC: 4.2 MMOL/L (ref 3.4–5.3)
PROT SERPL-MCNC: 6.8 G/DL (ref 6.4–8.3)
RBC # BLD AUTO: 5.45 10E6/UL (ref 4.4–5.9)
SODIUM SERPL-SCNC: 132 MMOL/L (ref 135–145)
TROPONIN T SERPL HS-MCNC: 13 NG/L
TROPONIN T SERPL HS-MCNC: 14 NG/L
TSH SERPL DL<=0.005 MIU/L-ACNC: 2.39 UIU/ML (ref 0.3–4.2)
WBC # BLD AUTO: 5.6 10E3/UL (ref 4–11)

## 2025-04-12 PROCEDURE — 83690 ASSAY OF LIPASE: CPT | Performed by: EMERGENCY MEDICINE

## 2025-04-12 PROCEDURE — 99284 EMERGENCY DEPT VISIT MOD MDM: CPT

## 2025-04-12 PROCEDURE — 82435 ASSAY OF BLOOD CHLORIDE: CPT | Performed by: EMERGENCY MEDICINE

## 2025-04-12 PROCEDURE — 85041 AUTOMATED RBC COUNT: CPT | Performed by: EMERGENCY MEDICINE

## 2025-04-12 PROCEDURE — 84484 ASSAY OF TROPONIN QUANT: CPT | Performed by: EMERGENCY MEDICINE

## 2025-04-12 PROCEDURE — 36415 COLL VENOUS BLD VENIPUNCTURE: CPT | Performed by: EMERGENCY MEDICINE

## 2025-04-12 PROCEDURE — 85004 AUTOMATED DIFF WBC COUNT: CPT | Performed by: EMERGENCY MEDICINE

## 2025-04-12 PROCEDURE — 82310 ASSAY OF CALCIUM: CPT | Performed by: EMERGENCY MEDICINE

## 2025-04-12 PROCEDURE — 83735 ASSAY OF MAGNESIUM: CPT | Performed by: EMERGENCY MEDICINE

## 2025-04-12 PROCEDURE — 84443 ASSAY THYROID STIM HORMONE: CPT | Performed by: EMERGENCY MEDICINE

## 2025-04-12 PROCEDURE — 93005 ELECTROCARDIOGRAM TRACING: CPT | Performed by: EMERGENCY MEDICINE

## 2025-04-12 ASSESSMENT — ACTIVITIES OF DAILY LIVING (ADL)
ADLS_ACUITY_SCORE: 54

## 2025-04-12 ASSESSMENT — ENCOUNTER SYMPTOMS
ABDOMINAL PAIN: 0
PALPITATIONS: 1
CHILLS: 0
COUGH: 0
FEVER: 0
VOMITING: 0
SHORTNESS OF BREATH: 0
HEADACHES: 0
DIARRHEA: 0
BLOOD IN STOOL: 0
NAUSEA: 0
WEAKNESS: 0

## 2025-04-12 ASSESSMENT — COLUMBIA-SUICIDE SEVERITY RATING SCALE - C-SSRS
2. HAVE YOU ACTUALLY HAD ANY THOUGHTS OF KILLING YOURSELF IN THE PAST MONTH?: NO
1. IN THE PAST MONTH, HAVE YOU WISHED YOU WERE DEAD OR WISHED YOU COULD GO TO SLEEP AND NOT WAKE UP?: NO
6. HAVE YOU EVER DONE ANYTHING, STARTED TO DO ANYTHING, OR PREPARED TO DO ANYTHING TO END YOUR LIFE?: NO

## 2025-04-12 NOTE — ED PROVIDER NOTES
EMERGENCY DEPARTMENT ENCOUNTER      NAME: Gary Sutherland  AGE: 69 year old male  YOB: 1955  MRN: [id number]  EVALUATION DATE & TIME: 4/12/2025 12:11 PM    PCP: Dc Pierre    ED PROVIDER: Coby Baldwin PA-C      Chief Complaint   Patient presents with    Tachycardia         FINAL IMPRESSION:  1. PAC (premature atrial contraction)          ED COURSE & MEDICAL DECISION MAKING:    Pertinent Labs & Imaging studies reviewed. (See chart for details)    69 year old male presents to the Emergency Department for evaluation of heart racing.     Physical exam is remarkable for a well appearing male who is in no acute distress. Heart and lung sounds are clear diffusely throughout, regular rate and rhythm. Abdomen is soft and non-tender. Vital signs remarkable for hypertension but otherwise normal and he is afebrile.     CBC is remarkable for mild anemia with hemoglobin of 11.2 which appears chronic, no leukocytosis.  CMP is remarkable for mild hyponatremia with sodium of 132 but this is actually improved compared to his baseline; no significant electrolyte derangements and liver and kidney function are normal.  Lipase within normal limits.  Magnesium within normal limits.  Troponin is within reference range for a male at 14, 2-hour delta troponin not significantly changed at 13.  TSH within normal limits.  EKG shows PACs but is otherwise unremarkable with no ischemic changes.    I do not think any further emergent labs or imaging are indicated at this time.  The patient was essentially asymptomatic by the time he arrived here in the emergency department and his workup today is reassuring.  Low concern for ACS with 2 negative high-sensitivity troponins and nonischemic EKG.  He does have a history of PACs/supraventricular tachycardia and did not have any abnormal rhythms on our cardiac monitor throughout his visit today.  He denies any chest pain, shortness of breath, and he is not hypoxic or tachycardic so  low clinical concern for PE.  His abdomen is completely benign on exam and he denies any nausea, vomiting, or abdominal pain so with normal abdominal labs I do not think dedicated abdominal imaging is indicated.  He denies any ripping or tearing pain concerning for dissection.  Advised the patient to follow-up with his primary care provider for recheck and return here for any new or worsening symptoms.  He is agreeable with this treatment plan and verbalized understanding.    Medical Decision Making  I obtained history from Family Member/Significant Other  I reviewed the EMR: Outpatient Record: Office visit earlier today  Care impacted by SIADH  I considered additional work up, including ddimer, but deferred patient denies hypoxia, tachycardia, and has nohistory of pe or dvt  Discharge. No recommendations on prescription strength medication(s). I considered admission, but ultimately discharged patient after reassuring labs and imaging.    MIPS (CTPE, Dental pain, Early, Sinusitis, Asthma/COPD, Head Trauma): Not Applicable    SEPSIS: None        ED Course   12:15 PM Performed my initial history and physical exam. Discussed workup in the emergency department, management of symptoms, and likely disposition.   2:25 PM Updated patient and discussed discharge.  3:24 PM I discussed the plan for discharge with the patient or family and they are agreeable.. We discussed supportive cares at home and reasons for return to the ER including new or worsening symptoms - all questions and concerns addressed. Patient to be discharged by RN.    At the conclusion of the encounter I discussed the results of all of the tests and the disposition. The questions were answered. The patient or family acknowledged understanding and was agreeable with the care plan.     Voice recognition software was used in the creation of this note. Any grammatical or nonsensical errors are due to inherent errors with the software and are not the intention  "of the writer.     MEDICATIONS GIVEN IN THE EMERGENCY:  Medications - No data to display    NEW PRESCRIPTIONS STARTED AT TODAY'S ER VISIT  New Prescriptions    No medications on file            =================================================================    HPI    Patient information was obtained from: Patient    Use of : N/A        Gary Sutherland is a 69 year old male with PMH of alpha thalassemia minor, giant cell arteritis, SIADH, hypothyroidism, paroxysmal supraventricular tachycardia who presents to the ED via walk-in for evaluation of palpitations.     The patient reports that he \"overdid it\" yesterday doing yard work.  Today, he was having a \"large bowel movement\" when he suddenly felt his palms become sweaty and his heart rate increased.  He estimates his heart rate was about 105 bpm and it stayed around that rate for about 30 minutes. He states his heart rate felt regular but more \"pounding than usual.\" He also notes he was having a lot of belching and \"indigestion\" this morning. He is now feeling improved. He went to urgent care who sent him here for further evaluation. He states he has otherwise been in good health recently.     He denies fevers, chills, cough, chest pain, sob, abdominal pain, nausea, vomiting, abdominal pain, black or bloody stools, dizziness, syncope.       REVIEW OF SYSTEMS   Review of Systems   Constitutional:  Negative for chills and fever.   Respiratory:  Negative for cough and shortness of breath.    Cardiovascular:  Positive for palpitations. Negative for chest pain.   Gastrointestinal:  Negative for abdominal pain, blood in stool, diarrhea, nausea and vomiting.   Neurological:  Negative for syncope, weakness and headaches.       All other systems reviewed and are negative unless noted in HPI.      PAST MEDICAL HISTORY:  No past medical history on file.    PAST SURGICAL HISTORY:  Past Surgical History:   Procedure Laterality Date    ARTERY BIOPSY Bilateral " [id number]    TONSILLECTOMY         CURRENT MEDICATIONS:    amLODIPine (NORVASC) 5 MG tablet  atorvastatin (LIPITOR) 10 MG tablet  cholecalciferol, vitamin D3, 1,000 unit tablet  docusate sodium (COLACE) 100 MG capsule  levothyroxine (SYNTHROID, LEVOTHROID) 75 MCG tablet  Multiple Vitamins-Minerals (MULTIVITAMIN MEN 50+ PO)  predniSONE (DELTASONE) 1 MG tablet        ALLERGIES:  No Known Allergies    FAMILY HISTORY:  Family History   Problem Relation Age of Onset    Heart Disease Mother     Thyroid Disease Mother     Anemia Mother     Coronary Artery Disease Father     Breast Cancer Sister     Skin Cancer Sister     Thyroid Disease Sister     Cerebrovascular Disease Brother        SOCIAL HISTORY:   Social History     Socioeconomic History    Marital status:    Tobacco Use    Smoking status: Never    Smokeless tobacco: Never   Substance and Sexual Activity    Alcohol use: Yes    Drug use: No     Social Drivers of Health     Financial Resource Strain: Low Risk  (11/11/2022)    [facility] from [facility], [facility]    Overall Financial Resource Strain (CARDIA)     Difficulty of Paying Living Expenses: Not hard at all   Food Insecurity: No Food Insecurity (6/12/2024)    Received from [facility]    Hunger Vital Sign     Worried About Running Out of Food in the Last Year: Never true     Ran Out of Food in the Last Year: Never true   Transportation Needs: No Transportation Needs (6/12/2024)    Received from [facility]    PRAPARE - Transportation     Lack of Transportation (Medical): No     Lack of Transportation (Non-Medical): No   Physical Activity: Sufficiently Active (6/12/2024)    Received from [facility]    Exercise Vital Sign     Days of Exercise per Week: 4 days     Minutes of Exercise per Session: 40 min   Stress: Stress Concern Present (11/11/2022)    Received from [facility], [facility]    [employer] - Occupational Stress Questionnaire     Feeling of Stress : Rather much    Social Connections: Unknown (11/11/2022)    Received from [facility]    Social Connection and Isolation Panel [NHANES]     Frequency of Communication with Friends and Family: Twice a week     Attends Baptism Services: More than 4 times per year     Active Member of Clubs or Organizations: No     Marital Status:    Interpersonal Safety: Not At Risk (11/11/2022)    [facility] from [facility], [facility]    Humiliation, Afraid, Rape, and Kick questionnaire     Fear of Current or Ex-Partner: No     Emotionally Abused: No     Physically Abused: No     Sexually Abused: No   Housing Stability: Low Risk  (6/12/2024)    Received from [facility]    Housing Stability     What is your living situation today?: I have a steady place to live       VITALS:  Patient Vitals for the past 24 hrs:   BP Temp Pulse Resp SpO2 Height Weight   04/12/25 1344 (!) 164/86 -- 68 13 99 % -- --   04/12/25 1314 (!) 157/80 -- 68 13 99 % -- --   04/12/25 1259 (!) 159/76 -- 68 14 98 % -- --   04/12/25 1248 (!) 161/81 -- 67 14 99 % -- --   04/12/25 1229 (!) 161/86 -- 76 18 99 % -- --   04/12/25 1227 (!) 181/92 -- 73 12 100 % -- --   04/12/25 1204 (!) 177/87 98.5  F (36.9  C) 78 18 99 % 1.829 m (6') 65.8 kg (145 lb 1.6 oz)       PHYSICAL EXAM    VITAL SIGNS: BP (!) 164/86   Pulse 68   Temp 98.5  F (36.9  C)   Resp 13   Ht 1.829 m (6')   Wt 65.8 kg (145 lb 1.6 oz)   SpO2 99%   BMI 19.68 kg/m    General Appearance: Alert, cooperative, normal speech and facial symmetry, appears stated age, the patient does not appear in distress  Head:  Normocephalic, without obvious abnormality, atraumatic  Cardio:  Regular rate and rhythm, S1 and S2 normal, no murmur, rub    or gallop, 2+ pulses symmetric in all extremities  Pulm:  Clear to auscultation bilaterally, respirations unlabored with no accessory muscle use  Abdomen:  Abdomen is soft, non-distended with no tenderness to palpation, rebound tenderness, or guarding.   Extremities:  Extremities  normal, there is no tenderness to palpation, atraumatic, no cyanosis or edema, full function and range of motion, pulses equal in all extremities, normal cap refill, no joint swelling  Skin: Skin is warm and dry, no rashes or wounds  Neuro: Patient is awake, alert, and responsive to voice. No gross motor weaknesses or sensory loss; moves all extremities.    LAB:  All pertinent labs reviewed and interpreted.  Labs Ordered and Resulted from Time of ED Arrival to Time of ED Departure   COMPREHENSIVE METABOLIC PANEL - Abnormal       Result Value    Sodium 132 (*)     Potassium 4.2      Carbon Dioxide (CO2) 28      Anion Gap 10      Urea Nitrogen 16.7      Creatinine 0.89      GFR Estimate >90      Calcium 9.3      Chloride 94 (*)     Glucose 103 (*)     Alkaline Phosphatase 51      AST 31      ALT 16      Protein Total 6.8      Albumin 4.2      Bilirubin Total 0.3     CBC WITH PLATELETS AND DIFFERENTIAL - Abnormal    WBC Count 5.6      RBC Count 5.45      Hemoglobin 11.2 (*)     Hematocrit 37.2 (*)     MCV 68 (*)     MCH 20.6 (*)     MCHC 30.1 (*)     RDW 14.9      Platelet Count 240      % Neutrophils 72      % Lymphocytes 13      % Monocytes 13      % Eosinophils 1      % Basophils 1      % Immature Granulocytes 0      NRBCs per 100 WBC 0      Absolute Neutrophils 4.0      Absolute Lymphocytes 0.7 (*)     Absolute Monocytes 0.7      Absolute Eosinophils 0.0      Absolute Basophils 0.0      Absolute Immature Granulocytes 0.0      Absolute NRBCs 0.0     LIPASE - Normal    Lipase 59     TROPONIN T, HIGH SENSITIVITY - Normal    Troponin T, High Sensitivity 14     MAGNESIUM - Normal    Magnesium 2.0     TSH WITH FREE T4 REFLEX - Normal    TSH 2.39     TROPONIN T, HIGH SENSITIVITY - Normal    Troponin T, High Sensitivity 13         RADIOLOGY:  Reviewed all pertinent imaging. Please see official radiology report.  No orders to display       EKG:    Performed at: 1228    I have independently reviewed and interpreted the EKG,  along with the final read. EKG also reviewed by Dr. Joyce Colon.    Ventricular rate 76 bpm  ND interval 136 ms  QRS duration 84 ms  QT/QTc 378/425 ms  P-R-T axes 86 78 83    Impression: Sinus rhythm with likely PACs      Coby Baldwin PA-C  Emergency Medicine  [facility] EMERGENCY DEPARTMENT  [facility] [facility] [id number] [phone]  Dept: [phone]         Coby Baldwin PA-C  04/12/25 1524

## 2025-04-12 NOTE — ED TRIAGE NOTES
Comes for evaluation of episode of tachycardia earlier today along with sweaty palms. Denies currently. Was seen today at [facility] in [place] for palpitatons and referred to ED for labs.    Triage Assessment (Adult)       Row Name 04/12/25 1205          Triage Assessment    Airway WDL WDL        Respiratory WDL    Respiratory WDL WDL        Skin Circulation/Temperature WDL    Skin Circulation/Temperature WDL WDL        Cardiac WDL    Cardiac WDL X  episode of tachycardia earlier        Peripheral/Neurovascular WDL    Peripheral Neurovascular WDL X  hypertensive        Cognitive/Neuro/Behavioral WDL    Cognitive/Neuro/Behavioral WDL WDL

## 2025-04-12 NOTE — DISCHARGE INSTRUCTIONS
You were seen here today for evaluation of rapid heart rate and sweating.  Your workup today is reassuring without evidence of heart attack, electrolyte problems, thyroid dysfunction, or kidney dysfunction.  Your EKG did show some premature atrial contractions which are typically benign but was otherwise normal.    Follow-up with your primary care provider for recheck in the next few days.  Return here for any new or worsening symptoms including chest pain, difficulty breathing, passing out, severe lightheadedness/dizziness, fever, or any other symptoms that concern you.

## 2025-04-16 LAB
ATRIAL RATE - MUSE: 76 BPM
DIASTOLIC BLOOD PRESSURE - MUSE: 92 MMHG
INTERPRETATION ECG - MUSE: NORMAL
P AXIS - MUSE: 86 DEGREES
PR INTERVAL - MUSE: 136 MS
QRS DURATION - MUSE: 84 MS
QT - MUSE: 378 MS
QTC - MUSE: 425 MS
R AXIS - MUSE: 78 DEGREES
SYSTOLIC BLOOD PRESSURE - MUSE: 181 MMHG
T AXIS - MUSE: 83 DEGREES
VENTRICULAR RATE- MUSE: 76 BPM

## 2025-07-12 ENCOUNTER — APPOINTMENT (OUTPATIENT)
Dept: CT IMAGING | Facility: HOSPITAL | Age: 70
End: 2025-07-12
Payer: COMMERCIAL

## 2025-07-12 ENCOUNTER — HOSPITAL ENCOUNTER (EMERGENCY)
Facility: HOSPITAL | Age: 70
Discharge: HOME | End: 2025-07-12
Attending: STUDENT IN AN ORGANIZED HEALTH CARE EDUCATION/TRAINING PROGRAM | Admitting: STUDENT IN AN ORGANIZED HEALTH CARE EDUCATION/TRAINING PROGRAM
Payer: COMMERCIAL

## 2025-07-12 VITALS
BODY MASS INDEX: 19.64 KG/M2 | OXYGEN SATURATION: 98 % | TEMPERATURE: 98.2 F | DIASTOLIC BLOOD PRESSURE: 83 MMHG | SYSTOLIC BLOOD PRESSURE: 162 MMHG | RESPIRATION RATE: 18 BRPM | HEIGHT: 72 IN | WEIGHT: 145 LBS | HEART RATE: 65 BPM

## 2025-07-12 DIAGNOSIS — S09.90XA INJURY OF HEAD, INITIAL ENCOUNTER: ICD-10-CM

## 2025-07-12 DIAGNOSIS — R55 SYNCOPE, UNSPECIFIED SYNCOPE TYPE: ICD-10-CM

## 2025-07-12 LAB
ALBUMIN UR-MCNC: NEGATIVE MG/DL
ANION GAP SERPL CALCULATED.3IONS-SCNC: 7 MMOL/L (ref 7–15)
APPEARANCE UR: CLEAR
BASOPHILS # BLD AUTO: 0.1 10E3/UL (ref 0–0.2)
BASOPHILS NFR BLD AUTO: 1 %
BILIRUB UR QL STRIP: NEGATIVE
BUN SERPL-MCNC: 20.6 MG/DL (ref 8–23)
CALCIUM SERPL-MCNC: 9.2 MG/DL (ref 8.8–10.4)
CHLORIDE SERPL-SCNC: 96 MMOL/L (ref 98–107)
COLOR UR AUTO: ABNORMAL
CREAT SERPL-MCNC: 1.04 MG/DL (ref 0.67–1.17)
EGFRCR SERPLBLD CKD-EPI 2021: 77 ML/MIN/1.73M2
EOSINOPHIL # BLD AUTO: 0 10E3/UL (ref 0–0.7)
EOSINOPHIL NFR BLD AUTO: 1 %
ERYTHROCYTE [DISTWIDTH] IN BLOOD BY AUTOMATED COUNT: 15.3 % (ref 10–15)
ERYTHROCYTE [SEDIMENTATION RATE] IN BLOOD BY WESTERGREN METHOD: 14 MM/HR (ref 0–20)
GLUCOSE SERPL-MCNC: 136 MG/DL (ref 70–99)
GLUCOSE UR STRIP-MCNC: NEGATIVE MG/DL
HCO3 SERPL-SCNC: 30 MMOL/L (ref 22–29)
HCT VFR BLD AUTO: 42.6 % (ref 40–53)
HGB BLD-MCNC: 12.6 G/DL (ref 13.3–17.7)
HGB UR QL STRIP: NEGATIVE
HYALINE CASTS: 5 /LPF
IMM GRANULOCYTES # BLD: 0 10E3/UL
IMM GRANULOCYTES NFR BLD: 1 %
KETONES UR STRIP-MCNC: NEGATIVE MG/DL
LEUKOCYTE ESTERASE UR QL STRIP: NEGATIVE
LYMPHOCYTES # BLD AUTO: 0.5 10E3/UL (ref 0.8–5.3)
LYMPHOCYTES NFR BLD AUTO: 6 %
MAGNESIUM SERPL-MCNC: 2.2 MG/DL (ref 1.7–2.3)
MCH RBC QN AUTO: 20.5 PG (ref 26.5–33)
MCHC RBC AUTO-ENTMCNC: 29.6 G/DL (ref 31.5–36.5)
MCV RBC AUTO: 69 FL (ref 78–100)
MONOCYTES # BLD AUTO: 0.7 10E3/UL (ref 0–1.3)
MONOCYTES NFR BLD AUTO: 9 %
MUCOUS THREADS #/AREA URNS LPF: PRESENT /LPF
NEUTROPHILS # BLD AUTO: 7.3 10E3/UL (ref 1.6–8.3)
NEUTROPHILS NFR BLD AUTO: 84 %
NITRATE UR QL: NEGATIVE
NRBC # BLD AUTO: 0 10E3/UL
NRBC BLD AUTO-RTO: 0 /100
PH UR STRIP: 7.5 [PH] (ref 5–7)
PLATELET # BLD AUTO: 229 10E3/UL (ref 150–450)
POTASSIUM SERPL-SCNC: 4.4 MMOL/L (ref 3.4–5.3)
RBC # BLD AUTO: 6.15 10E6/UL (ref 4.4–5.9)
RBC URINE: 1 /HPF
SODIUM SERPL-SCNC: 133 MMOL/L (ref 135–145)
SP GR UR STRIP: 1.02 (ref 1–1.03)
TROPONIN T SERPL HS-MCNC: 13 NG/L
TROPONIN T SERPL HS-MCNC: 17 NG/L
TSH SERPL DL<=0.005 MIU/L-ACNC: 2.83 UIU/ML (ref 0.3–4.2)
UROBILINOGEN UR STRIP-MCNC: NORMAL MG/DL
WBC # BLD AUTO: 8.7 10E3/UL (ref 4–11)
WBC URINE: 2 /HPF

## 2025-07-12 PROCEDURE — 70450 CT HEAD/BRAIN W/O DYE: CPT

## 2025-07-12 PROCEDURE — 99285 EMERGENCY DEPT VISIT HI MDM: CPT | Mod: 25

## 2025-07-12 PROCEDURE — 83735 ASSAY OF MAGNESIUM: CPT | Performed by: STUDENT IN AN ORGANIZED HEALTH CARE EDUCATION/TRAINING PROGRAM

## 2025-07-12 PROCEDURE — 36415 COLL VENOUS BLD VENIPUNCTURE: CPT

## 2025-07-12 PROCEDURE — 93005 ELECTROCARDIOGRAM TRACING: CPT | Performed by: STUDENT IN AN ORGANIZED HEALTH CARE EDUCATION/TRAINING PROGRAM

## 2025-07-12 PROCEDURE — 72125 CT NECK SPINE W/O DYE: CPT

## 2025-07-12 PROCEDURE — 82310 ASSAY OF CALCIUM: CPT

## 2025-07-12 PROCEDURE — 81001 URINALYSIS AUTO W/SCOPE: CPT

## 2025-07-12 PROCEDURE — 85652 RBC SED RATE AUTOMATED: CPT

## 2025-07-12 PROCEDURE — 84443 ASSAY THYROID STIM HORMONE: CPT

## 2025-07-12 PROCEDURE — 85004 AUTOMATED DIFF WBC COUNT: CPT

## 2025-07-12 PROCEDURE — 84484 ASSAY OF TROPONIN QUANT: CPT

## 2025-07-12 RX ORDER — ONDANSETRON 4 MG/1
4 TABLET, ORALLY DISINTEGRATING ORAL ONCE
Status: DISCONTINUED | OUTPATIENT
Start: 2025-07-12 | End: 2025-07-12 | Stop reason: HOSPADM

## 2025-07-12 ASSESSMENT — ACTIVITIES OF DAILY LIVING (ADL)
ADLS_ACUITY_SCORE: 54

## 2025-07-12 ASSESSMENT — COLUMBIA-SUICIDE SEVERITY RATING SCALE - C-SSRS
1. IN THE PAST MONTH, HAVE YOU WISHED YOU WERE DEAD OR WISHED YOU COULD GO TO SLEEP AND NOT WAKE UP?: NO
2. HAVE YOU ACTUALLY HAD ANY THOUGHTS OF KILLING YOURSELF IN THE PAST MONTH?: NO
6. HAVE YOU EVER DONE ANYTHING, STARTED TO DO ANYTHING, OR PREPARED TO DO ANYTHING TO END YOUR LIFE?: NO

## 2025-07-12 NOTE — ED PROVIDER NOTES
Emergency Department Encounter   NAME: Gary Sutherland ; AGE: 69 year old male ; YOB: 1955 ; MRN: [id number] ; PCP: Dc Pierre   ED PROVIDER: Haydee Chavez PA-C    Evaluation Date & Time:   7/12/2025 12:57 PM    CHIEF COMPLAINT:  Fall      Impression and Plan   MDM: Gary Sutherland is a 69 year old male with history of SIADH, SVT who presents to the ED for evaluation of fall.  Patient felt queasy all morning and laid down on his couch.  When he got up to use the bathroom, he felt lightheaded.  While sitting on the toilet, he felt like he was going to pass out and he fell forward, hitting his forehead on the cabinet in front of him and then on the floor.  He hyperextended his neck when he hit the cabinet, and is now complaining of neck pain.  His wife reported that he was not responding to her for few minutes, but patient states he was hearing her the entire time.  She tried to give him warm water, and he immediately vomited this up.  Patient denies any blood thinner use, headache, chest pain, palpitations, abdominal pain, dysuria.     Vitals reviewed and patient is hypertensive to 150/70.  Afebrile, not tachycardic or tachypneic, and satting well on room air. On exam patient has no abdominal tenderness to palpation.  No weakness or numbness of upper and lower extremities.  Pupils equal and reactive to light bilaterally.  Patient initially in c-collar, but full ROM of the neck with minimal pain once removed.  2 superficial abrasions on left forehead, and hematoma above left eye as well as bruising lateral to left eye. Differential diagnosis/emergent conditions considered and evaluated for includes but not limited to ACS, arrhythmia, vasovagal syncope, orthostatic hypotension, hyponatremia, hypothyroidism, cystitis, PE.     No concern for pulmonary embolism as the cause of his syncope today, as patient is not tachycardic or tachypneic and is satting well on room air with no complaints of chest  pain or shortness of breath.  No history of blood clots to increase concern for PE today. CT head and neck negative for any acute intracranial abnormality or acute fracture.  Patient mildly anemic with hemoglobin of 12.6, but this is increased from his previous 3 months ago.  Sodium was 133 on BMP, which is mildly low, but this is actually increased from patient's baseline.  Likely not the cause of his syncopal episode.  Troponin is 17 but stable on repeat, and EKG is unremarkable for any sign of ST elevation or depression or significant arrhythmia.  TSH normal.  UA negative for any infection.  Unable to find any significant cause for the patient's syncopal episode basic lab and imaging workup.  I explained to the patient that with his history of SVT, I cannot rule out a brief arrhythmia that caused his symptoms.  Because of this, I placed a rapid access referral to ensure patient has a quick cardiac follow-up for further evaluation.  Of note, patient was seen on 4/12/2025 for palpitations but no abnormal rhythms were observed on cardiac monitor throughout the visit.  I feel he is an appropriate patient for outpatient treatment, as he is not having any chest pain, palpitations, or shortness of breath, and his vitals are all stable with no elevation of his delta troponin.  It is also possible that the patient had an episode of orthostatic hypotension, as he has a known history of this, or that he experienced vasovagal syncope. Patient ambulated successfully in the ED, so I feel he is appropriate for discharge at this time.  I discussed to return to the ER if he has any chest pain, palpitations, shortness of breath or further episodes of syncope.      ED COURSE:  1:26 PM I met and introduced myself to the patient. I gathered initial history and performed my physical exam. We discussed plan for initial workup.       3:49 PM I have staffed the patient with Dr. Mendenhall, ED MD, who has evaluated the patient and agrees  "with all aspects of today's care.   5:50 PM I rechecked the patient and discussed results, discharge, follow up, and reasons to return to the ED.     At the conclusion of the encounter I discussed the results of all the tests and the disposition. The questions were answered. The patient or family acknowledged understanding and was agreeable with the care plan.    Medical Decision Making  I reviewed the EMR: ER visit from 4/12/25  Discharge. No recommendations on prescription strength medication(s). See documentation for any additional details.    MIPS (CTPE, Dental pain, Early, Sinusitis, Asthma/COPD, Head Trauma): Adult Minor Head Trauma:Age 65 years or older    SEPSIS: None        FINAL IMPRESSION:    ICD-10-CM    1. Syncope, unspecified syncope type  [facility]  Referral      2. Injury of head, initial encounter  S09.90XA             MEDICATIONS GIVEN IN THE EMERGENCY DEPARTMENT:  Medications   ondansetron (ZOFRAN ODT) ODT tab 4 mg (has no administration in time range)   benzocaine-menthol (CHLORASEPTIC) 6-10 MG lozenge 1 lozenge (has no administration in time range)         NEW PRESCRIPTIONS STARTED AT TODAY'S ED VISIT:  New Prescriptions    No medications on file         HPI   Use of Intrepreter: N/A     Gary Sutherland is a 69 year old male with a pertinent history of SIADH, hyponatremia, SVT, temporal arteritis, osteopenia, hypothyroidism, alpha thalassemia minor, primary hypertension, orthostatic hypotension who presents to the ED for evaluation of fall.     This morning, patient started to feel nauseous and generally not well after drinking a peanut butter smoothie and watering his plants outside.  He states normally the pain approximately makes it feel queasy because it is \"too rich\" to drink in the morning.  He laid down and felt better.  However, when he got up to use the bathroom, he felt very faint, his forearms became hot and diaphoretic, and he became nauseous.  He sat down on " the toilet, and then felt like he was going to pass out.  He fell forward, hitting his forehead on the cabinet, then hitting his head on the floor.  His neck snapped backward when he hit his head, and he is now having neck pain.  Placed in c-collar by EMS.  He called for his wife, who helped him back on the toilet and states he was cold and not responding to her for a few minutes.  She states his eyes were open and not focused.  Patient states that he was able to hear her this entire time and denies completely losing consciousness.  She gave him warm water to drink, and he immediately threw this up.  He then was able to have a bowel movement and felt a bit better. Not on blood thinners.    He states he has had similar symptoms in the past when he has had food poisoning.  He states he has had a previous history of paroxysmal SVT, but he denies any chest pain, shortness of breath, or palpitations immediately prior to the syncopal episode.  He also reports a history of SIADH and chronically low sodium.  He denies any vision changes, headache, dysuria, fever, extremity weakness or numbness.       REVIEW OF SYSTEMS:  Pertinent positive and negative symptoms per HPI.       Medical History     No past medical history on file.    Past Surgical History:   Procedure Laterality Date    ARTERY BIOPSY Bilateral [id number]    TONSILLECTOMY         Family History   Problem Relation Age of Onset    Heart Disease Mother     Thyroid Disease Mother     Anemia Mother     Coronary Artery Disease Father     Breast Cancer Sister     Skin Cancer Sister     Thyroid Disease Sister     Cerebrovascular Disease Brother        Social History     Tobacco Use    Smoking status: Never    Smokeless tobacco: Never   Substance Use Topics    Alcohol use: Yes    Drug use: No       amLODIPine (NORVASC) 5 MG tablet  atorvastatin (LIPITOR) 10 MG tablet  cholecalciferol, vitamin D3, 1,000 unit tablet  docusate sodium (COLACE) 100 MG capsule  levothyroxine  (SYNTHROID, LEVOTHROID) 75 MCG tablet  Multiple Vitamins-Minerals (MULTIVITAMIN MEN 50+ PO)  predniSONE (DELTASONE) 1 MG tablet          Physical Exam     First Vitals:  Patient Vitals for the past 24 hrs:   BP Temp Temp src Pulse Resp SpO2 Height Weight   07/12/25 1300 (!) 155/70 -- -- 69 -- 99 % -- --   07/12/25 1241 (!) 143/70 98.2  F (36.8  C) Temporal 71 18 97 % 1.829 m (6') 65.8 kg (145 lb)         PHYSICAL EXAM:   Physical Exam  Vitals reviewed.   Constitutional:       General: He is not in acute distress.     Appearance: Normal appearance. He is not toxic-appearing.   HENT:      Head: Atraumatic.   Eyes:      General: No scleral icterus.     Conjunctiva/sclera: Conjunctivae normal.      Pupils: Pupils are equal, round, and reactive to light.   Neck:      Comments: Patient in C collar  Cardiovascular:      Rate and Rhythm: Normal rate.      Heart sounds: Normal heart sounds.   Pulmonary:      Effort: Pulmonary effort is normal. No respiratory distress.      Breath sounds: Normal breath sounds.   Abdominal:      Palpations: Abdomen is soft.      Tenderness: There is no abdominal tenderness.   Musculoskeletal:         General: No deformity. Normal range of motion.      Cervical back: Neck supple.   Skin:     General: Skin is warm.      Comments: Two superficial abrasions on R forehead. Bruising above L eye and lateral to L eye   Neurological:      General: No focal deficit present.      Mental Status: He is alert and oriented to person, place, and time.      Sensory: No sensory deficit.      Motor: No weakness.             Results     LAB:  All pertinent labs reviewed and interpreted  Labs Ordered and Resulted from Time of ED Arrival to Time of ED Departure   BASIC METABOLIC PANEL - Abnormal       Result Value    Sodium 133 (*)     Potassium 4.4      Chloride 96 (*)     Carbon Dioxide (CO2) 30 (*)     Anion Gap 7      Urea Nitrogen 20.6      Creatinine 1.04      GFR Estimate 77      Calcium 9.2      Glucose 136  (*)    ROUTINE UA WITH MICROSCOPIC REFLEX TO CULTURE - Abnormal    Color Urine Light Yellow      Appearance Urine Clear      Glucose Urine Negative      Bilirubin Urine Negative      Ketones Urine Negative      Specific Gravity Urine 1.017      Blood Urine Negative      pH Urine 7.5 (*)     Protein Albumin Urine Negative      Urobilinogen Urine Normal      Nitrite Urine Negative      Leukocyte Esterase Urine Negative      Mucus Urine Present (*)     RBC Urine 1      WBC Urine 2      Hyaline Casts Urine 5 (*)    CBC WITH PLATELETS AND DIFFERENTIAL - Abnormal    WBC Count 8.7      RBC Count 6.15 (*)     Hemoglobin 12.6 (*)     Hematocrit 42.6      MCV 69 (*)     MCH 20.5 (*)     MCHC 29.6 (*)     RDW 15.3 (*)     Platelet Count 229      % Neutrophils 84      % Lymphocytes 6      % Monocytes 9      % Eosinophils 1      % Basophils 1      % Immature Granulocytes 1      NRBCs per 100 WBC 0      Absolute Neutrophils 7.3      Absolute Lymphocytes 0.5 (*)     Absolute Monocytes 0.7      Absolute Eosinophils 0.0      Absolute Basophils 0.1      Absolute Immature Granulocytes 0.0      Absolute NRBCs 0.0     TROPONIN T, HIGH SENSITIVITY - Normal    Troponin T, High Sensitivity 17     TSH WITH FREE T4 REFLEX - Normal    TSH 2.83     ERYTHROCYTE SEDIMENTATION RATE AUTO - Normal    Erythrocyte Sedimentation Rate 14     TROPONIN T, HIGH SENSITIVITY - Normal    Troponin T, High Sensitivity 13     MAGNESIUM - Normal    Magnesium 2.2         RADIOLOGY:  CT Cervical Spine w/o Contrast   Final Result   IMPRESSION:   1.  No evidence of acute fracture or subluxation of the cervical spine by CT imaging.      Head CT w/o contrast   Final Result   IMPRESSION:     1.  No evidence of acute intracranial hemorrhage or mass effect.   2.  Mild nonspecific white matter changes.   3.  Mild brain parenchymal volume loss.            ECG:  Performed at: 13:06    Impression: Sinus rhythm, possible left atrial enlargement, borderline ECG    Rate: 67  BPM  Rhythm: sinus  Axis: 83 82 84   TN Interval: 150 ms  QRS Interval: 92 ms  QTc Interval: 404 ms  ST Changes: none  Comparison: When compared with ECG of April 12, 2025, premature supraventricular complexes are no longer present    EKG results reviewed and interpreted by Dr. Mendenhall, ED MD.     PROCEDURES:  none      Haydee Chavez PA-C   Emergency Medicine   [facility] EMERGENCY DEPARTMENT        Haydee Chavez PA-C  07/12/25 8849

## 2025-07-12 NOTE — ED PROVIDER NOTES
Emergency Department Midlevel Supervisory Note     I had a face to face encounter with this patient seen by the Advanced Practice Provider (NAILA). I personally made/approved the management plan and take responsibility for the patient management. I personally saw patient and performed a substantive portion of the visit including all aspects of the medical decision making.     ED Course:  3:49 PM Haydee Chavez PA-C staffed patient with me. I agree with their assessment and plan of management, and I will see the patient.  5:01 PM  I met with the patient to introduce myself, gather additional history, perform my initial exam, and discuss the plan.     Brief HPI:     Gary Sutherland is a 69 year old male who presents for evaluation of fall.    I, Marck Castillo, am serving as a scribe to document services personally performed by Reema Mendenhall MD based on my observations and the provider's statements to me.   I, Reema Mendenhall MD attest that Marck Castillo was acting in a scribe capacity, has observed my performance of the services and has documented them in accordance with my direction.    Brief Physical Exam: BP (!) 162/83   Pulse 65   Temp 98.2  F (36.8  C) (Temporal)   Resp 18   Ht 1.829 m (6')   Wt 65.8 kg (145 lb)   SpO2 98%   BMI 19.67 kg/m    Constitutional: Well developed, well nourished. Comfortable appearing.   HENT: Some bruising lateral to the left eye, no focal bony orbital tenderness, Mucous membranes moist, nose midline.   Eyes: PERRL, mid-range pupils, conjunctiva normal.  Neck: No midline cervical spine tenderness. Normal ROM neck without pain  Respiratory: CTAB. Normal work of breathing  Cardiovascular: Regular rate and rhythm. Extremities warm and well perfused  Musculoskeletal:   Moving all extremities without pain, no deformities or focal areas of tenderness noted  Abdomen: Soft. Non-tender in all quadrants. No guarding  Back: No midline C,T,L tenderness or deformity. No abrasions or bony  stepoffs.  Neuro: Alert and oriented, CN II-XII grossly intact, speech clear. 5/5 strength in upper and lower extremities. Sensation to light touch intact in all 4 extremities. No pronator drift. No dysmetria with finger to nose.      MDM:  68 y/o M with h/o SVT, HTN, orthostatic hypotension, SIADH, HLP, hypothyroidism, GERD, giant cell arteritis among others who presents with fall.    Patient was doing some yard work. When he went inside he had laid down and then got up to go to the bathroom. He felt lightheaded, queasy/nauseous and fell while sitting on the toilet. Wife reports he was awake with eyes open but not answering questions. She reports no seizure activity. Patient states he never lost consciousness. His wife tried to give him water and he did vomit after this.    No chest pain, shortness of breath, headache, vision changes, fevers, abdominal pain or other symptoms.    Dx includes but not limited to arrhythmia, ACS, PE (doubt no chest pain or shortness of breath, reassuring vitals), medication adverse effect, metabolic abnormality, hypovolemia, orthostatic hypotension, vasovagal syncope, anemia, intracranial pathology. EKG shows sinus rhythm without significant ST changes, normal Qtc, no obvious signs of brugada or WPW. Noted to have some occasional PVCs on the monitor here (h/o of this). Troponin 17-->13 and with no chest pain/shortness of breath, do not suspect ACS.    Other labs reviewed: Hgb 12.6 (near priors), Na 133 (h/o hyponatremia and this is near prior value of 132), Co2 minimally elevated (has had this prior as well, no respiratory symptoms). TSH, ESR, Mag all reassuring. UA is not infectious. CT head and C spine without acute findings. No concerning midline C spine tenderness on my exam. No focal neurologic deficits. Do not suspect stroke.    I considered admission and discussed/offered this to patient and his wife but also discussed that with reassuring workup, able to tolerate PO and  ambulate safely would be reasonable to discharge with close outpatient follow up and they would prefer this.     Strict return precautions discussed and patient and his wife are in agreement with plan, endorse understanding and their questions were answered.         1. Syncope, unspecified syncope type    2. Injury of head, initial encounter      Labs and Imaging:  Results for orders placed or performed during the hospital encounter of 07/12/25   Head CT w/o contrast    Impression    IMPRESSION:    1.  No evidence of acute intracranial hemorrhage or mass effect.  2.  Mild nonspecific white matter changes.  3.  Mild brain parenchymal volume loss.   CT Cervical Spine w/o Contrast    Impression    IMPRESSION:  1.  No evidence of acute fracture or subluxation of the cervical spine by CT imaging.   Basic metabolic panel   Result Value Ref Range    Sodium 133 (L) 135 - 145 mmol/L    Potassium 4.4 3.4 - 5.3 mmol/L    Chloride 96 (L) 98 - 107 mmol/L    Carbon Dioxide (CO2) 30 (H) 22 - 29 mmol/L    Anion Gap 7 7 - 15 mmol/L    Urea Nitrogen 20.6 8.0 - 23.0 mg/dL    Creatinine 1.04 0.67 - 1.17 mg/dL    GFR Estimate 77 >60 mL/min/1.73m2    Calcium 9.2 8.8 - 10.4 mg/dL    Glucose 136 (H) 70 - 99 mg/dL   UA with Microscopic reflex to Culture    Specimen: Urine, Clean Catch   Result Value Ref Range    Color Urine Light Yellow Colorless, Straw, Light Yellow, Yellow    Appearance Urine Clear Clear    Glucose Urine Negative Negative mg/dL    Bilirubin Urine Negative Negative    Ketones Urine Negative Negative mg/dL    Specific Gravity Urine 1.017 1.001 - 1.030    Blood Urine Negative Negative    pH Urine 7.5 (H) 5.0 - 7.0    Protein Albumin Urine Negative Negative mg/dL    Urobilinogen Urine Normal Normal mg/dL    Nitrite Urine Negative Negative    Leukocyte Esterase Urine Negative Negative    Mucus Urine Present (A) None Seen /LPF    RBC Urine 1 <=2 /HPF    WBC Urine 2 <=5 /HPF    Hyaline Casts Urine 5 (H) <=2 /LPF   Result Value  Ref Range    Troponin T, High Sensitivity 17 <=22 ng/L   TSH with free T4 reflex   Result Value Ref Range    TSH 2.83 0.30 - 4.20 uIU/mL   Erythrocyte sedimentation rate auto   Result Value Ref Range    Erythrocyte Sedimentation Rate 14 0 - 20 mm/hr   CBC with platelets and differential   Result Value Ref Range    WBC Count 8.7 4.0 - 11.0 10e3/uL    RBC Count 6.15 (H) 4.40 - 5.90 10e6/uL    Hemoglobin 12.6 (L) 13.3 - 17.7 g/dL    Hematocrit 42.6 40.0 - 53.0 %    MCV 69 (L) 78 - 100 fL    MCH 20.5 (L) 26.5 - 33.0 pg    MCHC 29.6 (L) 31.5 - 36.5 g/dL    RDW 15.3 (H) 10.0 - 15.0 %    Platelet Count 229 150 - 450 10e3/uL    % Neutrophils 84 %    % Lymphocytes 6 %    % Monocytes 9 %    % Eosinophils 1 %    % Basophils 1 %    % Immature Granulocytes 1 %    NRBCs per 100 WBC 0 <1 /100    Absolute Neutrophils 7.3 1.6 - 8.3 10e3/uL    Absolute Lymphocytes 0.5 (L) 0.8 - 5.3 10e3/uL    Absolute Monocytes 0.7 0.0 - 1.3 10e3/uL    Absolute Eosinophils 0.0 0.0 - 0.7 10e3/uL    Absolute Basophils 0.1 0.0 - 0.2 10e3/uL    Absolute Immature Granulocytes 0.0 <=0.4 10e3/uL    Absolute NRBCs 0.0 10e3/uL   Result Value Ref Range    Troponin T, High Sensitivity 13 <=22 ng/L   Result Value Ref Range    Magnesium 2.2 1.7 - 2.3 mg/dL     I have reviewed the relevant laboratory studies above.      EKG: I reviewed and independently interpreted the patient's EKG, with comments made as listed below. Please see scanned EKG for full report.       Procedures:  I was present for the key portions of procedures documented in NAILA/midlevel note, see midlevel note for further details.    Reema Mendenhall MD  [facility] EMERGENCY DEPARTMENT  [address] [place] [id number] [phone]       Reema Mendenhall MD  07/13/25 [id number]

## 2025-07-12 NOTE — ED TRIAGE NOTES
Patient was brought in by Jarrodina from home. 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 LOC unknown. Wife found on the floor in a daze with lacerations tot he face. Vomited x1 with wife and complaints of C2 pain with collar in place from medics      Triage Assessment (Adult)       Row Name 07/12/25 1240          Triage Assessment    Airway WDL WDL        Respiratory WDL    Respiratory WDL WDL        Skin Circulation/Temperature WDL    Skin Circulation/Temperature WDL X  Lacerations/ Abrasion to the face        Peripheral/Neurovascular WDL    Peripheral Neurovascular WDL WDL

## 2025-07-12 NOTE — DISCHARGE INSTRUCTIONS
Your emergency department evaluation today is reassuring.  I have placed a referral to our rapid access cardiac clinic, so they should reach out to schedule an appointment on Monday.  Please follow-up with them for a quick follow-up of your syncopal episode.  Please return to the ER if you have any sudden chest pain, palpitations, or shortness of breath or any further syncopal episodes.

## 2025-08-08 ENCOUNTER — ORDERS ONLY (AUTO-RELEASED) (OUTPATIENT)
Dept: CARDIOLOGY | Facility: CLINIC | Age: 70
End: 2025-08-08
Payer: COMMERCIAL

## 2025-08-08 DIAGNOSIS — R55 SYNCOPE, UNSPECIFIED SYNCOPE TYPE: ICD-10-CM
